# Patient Record
Sex: FEMALE | Race: WHITE
[De-identification: names, ages, dates, MRNs, and addresses within clinical notes are randomized per-mention and may not be internally consistent; named-entity substitution may affect disease eponyms.]

---

## 2018-04-06 ENCOUNTER — HOSPITAL ENCOUNTER (OUTPATIENT)
Dept: HOSPITAL 75 - CARD | Age: 73
End: 2018-04-06
Attending: INTERNAL MEDICINE
Payer: MEDICARE

## 2018-04-06 VITALS — DIASTOLIC BLOOD PRESSURE: 102 MMHG | SYSTOLIC BLOOD PRESSURE: 231 MMHG

## 2018-04-06 DIAGNOSIS — R07.9: ICD-10-CM

## 2018-04-06 DIAGNOSIS — I10: ICD-10-CM

## 2018-04-06 DIAGNOSIS — I25.10: Primary | ICD-10-CM

## 2018-04-06 PROCEDURE — 93017 CV STRESS TEST TRACING ONLY: CPT

## 2018-04-06 PROCEDURE — 78452 HT MUSCLE IMAGE SPECT MULT: CPT

## 2018-04-06 NOTE — STRESS TEST
DATE OF SERVICE:  04/06/2018



NUCLEAR MYOVIEW REPORT



REFERRING PHYSICIAN:

Dr. Araiza.



SUMMARY:

The patient was injected with 10.40 mCi of technetium-99 Myoview and the resting

images were obtained.  With peak stress level, a 32.1 mCi of technetium-99

Myoview were injected and the stress images were acquired, the resting and

stress images were reviewed and compared in the short axis, horizontal long

axis, and vertical long axis views.  Review of the images showed extracardiac

attenuation with ischemia involving the basal to mid inferior wall and basal to

mid anterior wall.  SSS is 9, SDS 5, TID value is 0.96.  On the gated images,

the left ventricle appeared to be prominent with diffuse left ventricular

hypokinesia, more pronounced at the inferior wall.  Calculated ejection fraction

of 39%.



CONCLUSION:

1.  Extracardiac attenuation with reversible ischemia involving the basal to mid

inferior wall and basal to mid anterior wall.

2.  Prominent left ventricle with mild diffuse left ventricular hypokinesia,

more pronounced at the inferior wall.  Calculated ejection fraction of 39%.





Job ID: [id number]

DocumentID: [id number]

Dictated Date:  04/06/2018 10:17:12

Transcription Date: 04/06/2018 11:43:25

Dictated By: MOHINDER PINA MD

## 2018-04-17 ENCOUNTER — HOSPITAL ENCOUNTER (OUTPATIENT)
Dept: HOSPITAL 75 - CATH | Age: 73
Discharge: HOME | End: 2018-04-17
Attending: INTERNAL MEDICINE
Payer: MEDICARE

## 2018-04-17 VITALS — DIASTOLIC BLOOD PRESSURE: 91 MMHG | SYSTOLIC BLOOD PRESSURE: 171 MMHG

## 2018-04-17 VITALS — DIASTOLIC BLOOD PRESSURE: 104 MMHG | SYSTOLIC BLOOD PRESSURE: 181 MMHG

## 2018-04-17 VITALS — WEIGHT: 140 LBS | BODY MASS INDEX: 27.48 KG/M2 | HEIGHT: 60 IN

## 2018-04-17 VITALS — DIASTOLIC BLOOD PRESSURE: 94 MMHG | SYSTOLIC BLOOD PRESSURE: 158 MMHG

## 2018-04-17 VITALS — DIASTOLIC BLOOD PRESSURE: 95 MMHG | SYSTOLIC BLOOD PRESSURE: 177 MMHG

## 2018-04-17 VITALS — SYSTOLIC BLOOD PRESSURE: 167 MMHG | DIASTOLIC BLOOD PRESSURE: 78 MMHG

## 2018-04-17 VITALS — SYSTOLIC BLOOD PRESSURE: 143 MMHG | DIASTOLIC BLOOD PRESSURE: 88 MMHG

## 2018-04-17 VITALS — SYSTOLIC BLOOD PRESSURE: 171 MMHG | DIASTOLIC BLOOD PRESSURE: 105 MMHG

## 2018-04-17 VITALS — DIASTOLIC BLOOD PRESSURE: 100 MMHG | SYSTOLIC BLOOD PRESSURE: 178 MMHG

## 2018-04-17 DIAGNOSIS — I73.9: ICD-10-CM

## 2018-04-17 DIAGNOSIS — R94.39: ICD-10-CM

## 2018-04-17 DIAGNOSIS — R06.02: ICD-10-CM

## 2018-04-17 DIAGNOSIS — I77.1: ICD-10-CM

## 2018-04-17 DIAGNOSIS — Z95.1: ICD-10-CM

## 2018-04-17 DIAGNOSIS — Z79.899: ICD-10-CM

## 2018-04-17 DIAGNOSIS — E78.5: ICD-10-CM

## 2018-04-17 DIAGNOSIS — I25.10: Primary | ICD-10-CM

## 2018-04-17 DIAGNOSIS — I10: ICD-10-CM

## 2018-04-17 DIAGNOSIS — Z79.82: ICD-10-CM

## 2018-04-17 LAB
ALBUMIN SERPL-MCNC: 4.6 GM/DL (ref 3.2–4.5)
ALP SERPL-CCNC: 97 U/L (ref 40–136)
ALT SERPL-CCNC: 11 U/L (ref 0–55)
APTT BLD: 28 SEC (ref 24–35)
BILIRUB SERPL-MCNC: 0.8 MG/DL (ref 0.1–1)
BUN/CREAT SERPL: 21
CALCIUM SERPL-MCNC: 9.7 MG/DL (ref 8.5–10.1)
CHLORIDE SERPL-SCNC: 106 MMOL/L (ref 98–107)
CHOLEST SERPL-MCNC: 190 MG/DL (ref ?–200)
CO2 SERPL-SCNC: 24 MMOL/L (ref 21–32)
CREAT SERPL-MCNC: 0.73 MG/DL (ref 0.6–1.3)
ERYTHROCYTE [DISTWIDTH] IN BLOOD BY AUTOMATED COUNT: 14.1 % (ref 10–14.5)
GFR SERPLBLD BASED ON 1.73 SQ M-ARVRAT: > 60 ML/MIN
GLUCOSE SERPL-MCNC: 103 MG/DL (ref 70–105)
HCT VFR BLD CALC: 46 % (ref 35–52)
HDLC SERPL-MCNC: 45 MG/DL (ref 40–60)
HGB BLD-MCNC: 15.6 G/DL (ref 11.5–16)
INR PPP: 1 (ref 0.8–1.4)
MCH RBC QN AUTO: 28 PG (ref 25–34)
MCHC RBC AUTO-ENTMCNC: 34 G/DL (ref 32–36)
MCV RBC AUTO: 83 FL (ref 80–99)
PLATELET # BLD: 177 10^3/UL (ref 130–400)
PMV BLD AUTO: 11.6 FL (ref 7.4–10.4)
POTASSIUM SERPL-SCNC: 4.1 MMOL/L (ref 3.6–5)
PROT SERPL-MCNC: 8.2 GM/DL (ref 6.4–8.2)
PROTHROMBIN TIME: 13.3 SEC (ref 12.2–14.7)
RBC # BLD AUTO: 5.55 10^6/UL (ref 4.35–5.85)
SODIUM SERPL-SCNC: 140 MMOL/L (ref 135–145)
TRIGL SERPL-MCNC: 193 MG/DL (ref ?–150)
VLDLC SERPL CALC-MCNC: 39 MG/DL (ref 5–40)
WBC # BLD AUTO: 9.1 10^3/UL (ref 4.3–11)

## 2018-04-17 PROCEDURE — 85730 THROMBOPLASTIN TIME PARTIAL: CPT

## 2018-04-17 PROCEDURE — 36415 COLL VENOUS BLD VENIPUNCTURE: CPT

## 2018-04-17 PROCEDURE — 85027 COMPLETE CBC AUTOMATED: CPT

## 2018-04-17 PROCEDURE — 85610 PROTHROMBIN TIME: CPT

## 2018-04-17 PROCEDURE — 87081 CULTURE SCREEN ONLY: CPT

## 2018-04-17 PROCEDURE — 93452 LEFT HRT CATH W/VENTRCLGRPHY: CPT

## 2018-04-17 PROCEDURE — 80061 LIPID PANEL: CPT

## 2018-04-17 PROCEDURE — 80053 COMPREHEN METABOLIC PANEL: CPT

## 2018-04-17 PROCEDURE — 93005 ELECTROCARDIOGRAM TRACING: CPT

## 2018-04-17 NOTE — CARDIAC PROCEDURE NOTE-CS/ASA
Pre-Procedure Note


Pre-Op Procedure Note


H&P Reviewed


The H&P was reviewed, patient examined and no changes noted.


Date H&P Reviewed:  Apr 17, 2018


Time H&P Reviewed:  11:25





Conscious Sedation Pre-Proced


Time Reviewed:  11:26


ASA Class:  3











Airway Mallampati Classification: ([place] appropriate class) I.  II.  III,  IV


 


Lungs 


 


Heart 


 


 ASA score


 


 ASA 1: a normal healthy patient


 


 ASA 2:  a patient with a mild systemic disease (mid diabetes, controlled 

hypertension, obesity 


 


 ASA 3:  a patient with a severe systemic disease that limits activity  (angina

, COPD, prior Myocardial infarction)


 


 ASA 4:  a patient with an incapacitating disease that is a constant threat to 

life (CHF, renal failure)


 


 ASA 5:  a moribund patient not expected to survive 24 hrs.  (ruptured aneurysm)


 


 ASA 6:  a declared brain dead patient whose organs are being harvested.


 


 For emergent operations, add the letter E after the classification








Grade 2


Sedation Plan:  Analgesia, Amnesia, Plan communicated to team members, 

Discussed options with patient/fam, Discussed risks with patient/fam


Note


The patient is an appropriate candidate to undergo the planned procedure, 

sedation, and anesthesia.





The patient immediately re-assessed prior to indication.











DAVID YOO MD FACP FAC CCDS Apr 17, 2018 11:26

## 2018-04-17 NOTE — DISCHARGE INST-POST CATH
Discharge Inst-CATH


Post Cardiac Cath D/C Inst


Follow Up/Plan


F/u with Dr Huff in 1-2 weeks


CARDIAC CATH DISCHARGE INSTRUCTIONS





*Hold Metformin for 48 hours post heart cath.





ACTIVITY





* Go Home directly and rest.


* Limit activity of the leg (or wrist if it was used) for 7 days including 

aerobics, swimming,


   jogging, bicycling, etc.


* Restrict stair-climbing for 7 days if possible, if not, climb up with your non

-cath leg, then


   bring together on the same step.


* Avoid lifting, pushing, pulling or excessive movement of the affected 

extremity for 7 days.


* Customary sexual activity may be resumed after 2 days-use caution not to use 

a position  


   that strains or causes pain to the affected extremity.


* No driving for 24 hours.


* NO SMOKING. 


* Avoid straining for bowel movements for 7 days.


* Gentle walking on level ground is allowed.


* Returning to work will depend on the type of procedure and the results. Your 

doctor will discuss


   this with you.





CALL YOUR DOCTOR FOR ANY OF THE FOLLOWING:





*If bleeding from the puncture site occurs- Apply gentle pressure to site with 

clean cloth and call


   your doctor or EMS.


* If a knot or lump forms under the skin, increases in size, or causes pain.


* If bruising appears to be worsening or moving further down your leg instead 

of disappearing.


* Temperature above 101 F.





CARE OF YOUR GROIN INCISION;





* Bruising or purple discoloration of the skin near the puncture site is common.


* You may shower only, no bathtub bathing for 5 days.  Be careful to avoid 

slipping as your


   leg may feel stiff.


* If a closure device was used on your femoral artery, please see the attached 

guide regarding


   care of the device and your leg.


* REMOVE the dressing from your groin the next day after your procedure in the 

shower.





CARE OF YOUR WRIST INCISION;





* Bruising or purple discoloration of the skin near the puncture site is common.


* You may shower.


* DO NOT submerge wrist.


* Remove dressing in 24 hours.











DAVID HUFF MD [facility] Apr 17, 2018 12:08

## 2018-04-17 NOTE — DISCHARGE INST-CARDIOLOGY
Discharge Inst-Cardiac


Discharge Medications


Continued Medications:  


Amlodipine Besylate (Amlodipine Besylate) 5 Mg Tablet


5 MG PO DAILY, TAB





Aspirin (Low Dose Aspirin) 81 Mg Tablet.dr


81 MG PO HS





Atorvastatin Calcium (Atorvastatin Calcium) 10 Mg Tablet


10 MG PO DAILY, TAB





Lisinopril (Lisinopril) 20 Mg Tablet


20 MG PO HS





Minoxidil (Minoxidil) 10 Mg Tab


10 MG PO BID, TAB











Orders-Post D/C & Referrals


Pneu Vac Indicated:  Yes











DAVID YOO MD FACP FACC CCDS Apr 17, 2018 12:08

## 2018-04-17 NOTE — CARDIAC CATHETERIZATION
DATE OF SERVICE:  04/17/2018



CARDIAC CATHETERIZATION REPORT



HISTORY:

The patient is a 73-year-old lady with a history of coronary artery bypass

surgery, who has had a recent stress test, which was reported as being abnormal.

 Cardiac catheterization was planned today.



PROCEDURE:

We brought her to the cardiac catheterization laboratory in a fasting state. 

Right groin was prepped and draped in usual sterile fashion.  1% Lidocaine was

used as local anesthesia.  Modified Seldinger technique was used to advance a

5-[place] sheath to the right femoral artery.  We then tried to advance a

catheter over a wire to the heart, but the abdominal aorta is very tortuous and

the wire was catching at various different spots within the abdominal aorta. 

The patient is known to have had thoracoabdominal aneurysm repair.  We felt that

it would be appropriate to hold off on the cardiac catheterization procedure

until a CT angiogram of the thoracoabdominal aorta has been performed for us to

know what the safest route to the cardiac catheterization would be. 

Accordingly, we did not make any attempt to advance the catheters (because of

tortuosity and difficulty with wire advancement).  We carried out angiography of

the right femoral artery through the sheath.  Mynx was used to achieve

hemostasis.  She tolerated the procedure well.





Job ID: [id number]

DocumentID: [id number]

Dictated Date:  04/17/2018 11:47:01

Transcription Date: 04/17/2018 12:58:46

Dictated By: DAVID YOO MD, MA, FACP, FACC,

## 2018-04-17 NOTE — XMS REPORT
Continuity of Care Document

 Created on: 2018



ADAMS WATKINS

External Reference #: [id number]

: 1945

Sex: Female



Demographics







 Address  [address] [place]  [address]

 

 Home Phone  [phone] x

 

 Preferred Language  Unknown

 

 Marital Status  Unknown

 

 Restorationism Affiliation  Unknown

 

 Race  Unknown

 

 Ethnic Group  Unknown





Author







 Author  [facility]

 

 Organization  [facility]

 

 Address  Unknown

 

 Phone  Unavailable



              



Allergies

      





 Active            Description            Code            Type            
Severity            Reaction            Onset            Reported/Identified   
         Relationship to Patient            Clinical Status        

 

 Yes            NKANo Known Allergies            NKA            Miscellaneous 
Allergy            Unknown            N/A                         2007   
                               



                  



Medications

      



There is no data.                  



Problems

      





 Date Dx Coded            Attending            Type            Code            
Diagnosis            Diagnosed By        

 

 2010                         Ot            442.3                        
          

 

 2010                         Ot            782.2                        
          

 

 2011                         Ot            729.5            PAIN IN LIMB
                     

 

 09/10/2011                         Ot            272.4            
HYPERLIPIDEMIA NEC/NOS                     

 

 09/10/2011                         Ot            276.8            
HYPOPOTASSEMIA                     

 

 09/10/2011                         Ot            401.9            HYPERTENSION 
NOS                     

 

 09/10/2011                         Ot            414.00            CORON 
ATHEROSCLER NOS TYPE VESSEL, NATIV                     

 

 09/10/2011                         Ot            441.4            ABDOM AORTIC 
ANEURYSM                     

 

 09/10/2011                         Ot            780.57            UNSPECIFIED 
SLEEP APNEA                     

 

 09/10/2011                         Ot            V45.81            
AORTOCORONARY BYPASS                     

 

 09/10/2011                         Ot            V58.63            LONG-TERM(
CURRENT)USE OF ANTIPLATELET/AN                     

 

 09/10/2011                         Ot            V58.66            LONG-TERM (
CURRENT) USE OF ASPIRIN                     

 

 09/10/2011                         Ot            V58.69            OTH MED,LT,
CURRENT USE                     

 

 2012                         Ot            272.4            
HYPERLIPIDEMIA NEC/NOS                     

 

 2012                         Ot            401.9            HYPERTENSION 
NOS                     

 

 2012                         Ot            414.01            CORONARY 
ATHEROSCLEROSIS OF NATIVE CORON                     

 

 2012                         Ot            414.2            CHRONIC 
TOTAL OCCLUSION OF CORONARY TIARA                     

 

 2012                         Ot            786.05            SHORTNESS 
OF BREATH                     

 

 2012                         Ot            786.50            CHEST PAIN 
NOS                     

 

 2012                         Ot            V45.81            
AORTOCORONARY BYPASS                     

 

 2012                         Ot            V45.82            
PERCUTANEOUS TRANSLUM CORON ANGIOPLASTY                      

 

 2012                         Ot            V45.82            
PERCUTANEOUS TRANSLUM CORON ANGIOPLASTY                      

 

 2012                         Ot            V57.89            
REHABILITATION PROC NEC                     

 

 2016                         Ot            272.4                        
          

 

 2016                         Ot            414.01                       
           

 

 2016                         Ot            V58.69                       
           

 

 2016                         Ot            272.4                        
          

 

 2016                         Ot            414.01                       
           

 

 2016                         Ot            V58.69                       
           

 

 2016                         Ot            414.01                       
           

 

 2016                         Ot            786.09                       
           

 

 2016                         Ot            V58.63                       
           

 

 2016                         Ot            V58.66                       
           

 

 2016                         Ot            V58.69                       
           

 

 2016                         Ot            276.8                        
          

 

 2016                         Ot            401.9                        
          

 

 2016                         Ot            414.01                       
           

 

 2016                         Ot            401.9                        
          

 

 2016                         Ot            V58.69                       
           

 

 2016                         Ot            272.4                        
          

 

 2016                         Ot            414.00                       
           

 

 2016                         Ot            V58.69                       
           

 

 2016                         Ot            441.4                        
          

 

 2016            ARUNA MEJIA MD            Ot            I10        
    ESSENTIAL (PRIMARY) HYPERTENSION                     

 

 2016            ARUNA MEJIA MD            Ot            I25.10     
       ATHSCL HEART DISEASE OF NATIVE CORONARY                      

 

 2016            ARUNA MEJIA MD            Ot            I25.82     
       CHRONIC TOTAL OCCLUSION OF CORONARY TIARA                     

 

 2016            ARUNA MEJIA MD            Ot            R07.9      
      CHEST PAIN, UNSPECIFIED                     

 

 2016            ARUNA MEJIA MD            Ot            Z79.899    
        OTHER LONG TERM (CURRENT) DRUG THERAPY                     

 

 2016            ARUNA MEJIA MD            Ot            Z87.891    
        PERSONAL HISTORY OF NICOTINE DEPENDENCE                     

 

 2016            ARUNA MEJIA MD            Ot            Z95.1      
      PRESENCE OF AORTOCORONARY BYPASS GRAFT                     

 

 2016            ARUNA MEJIA MD            Ot            Z98.61     
       CORONARY ANGIOPLASTY STATUS                     

 

 2016                         Ot            272.4                        
          

 

 2016                         Ot            414.01                       
           

 

 2016                         Ot            V58.69                       
           

 

 2016                         Ot            414.01                       
           

 

 2016                         Ot            786.09                       
           

 

 2016                         Ot            V58.63                       
           

 

 2016                         Ot            V58.66                       
           

 

 2016                         Ot            V58.69                       
           

 

 2016                         Ot            276.8                        
          

 

 2016                         Ot            401.9                        
          

 

 2016                         Ot            414.01                       
           

 

 2016                         Ot            401.9                        
          

 

 2016                         Ot            V58.69                       
           

 

 2016                         Ot            272.4                        
          

 

 2016                         Ot            414.00                       
           

 

 2016                         Ot            V58.69                       
           

 

 2016                         Ot            441.4                        
          

 

 2016                         Ot            272.4            
HYPERLIPIDEMIA NEC/NOS                     

 

 2016                         Ot            414.01            CORONARY 
ATHEROSCLEROSIS OF NATIVE CORON                     

 

 2016                         Ot            V58.69            OTH MED,LT,
CURRENT USE                     

 

 2016                         Ot            414.01            CORONARY 
ATHEROSCLEROSIS OF NATIVE CORON                     

 

 2016                         Ot            786.09            RESPIRATORY 
ABNORM NEC                     

 

 2016                         Ot            V58.63            LONG-TERM(
CURRENT)USE OF ANTIPLATELET/AN                     

 

 2016                         Ot            V58.66            LONG-TERM (
CURRENT) USE OF ASPIRIN                     

 

 2016                         Ot            V58.69            OTH MED,LT,
CURRENT USE                     

 

 2016                         Ot            276.8            
HYPOPOTASSEMIA                     

 

 2016                         Ot            401.9            HYPERTENSION 
NOS                     

 

 2016                         Ot            414.01            CORONARY 
ATHEROSCLEROSIS OF NATIVE CORON                     

 

 2016                         Ot            401.9            HYPERTENSION 
NOS                     

 

 2016                         Ot            V58.69            OTH MED,LT,
CURRENT USE                     

 

 2016                         Ot            272.4            
HYPERLIPIDEMIA NEC/NOS                     

 

 2016                         Ot            414.00            CORON 
ATHEROSCLER NOS TYPE VESSEL, NATIV                     

 

 2016                         Ot            V58.69            OTH MED,LT,
CURRENT USE                     

 

 2016                         Ot            441.4            ABDOM AORTIC 
ANEURYSM                     

 

 2018            TISH DE LOS SANTOS DO            Ot            I10     
       ESSENTIAL (PRIMARY) HYPERTENSION                     

 

 2018            TISH DE LOS SANTOS DO            Ot            I25.10  
          ATHSCL HEART DISEASE OF NATIVE CORONARY                      

 

 2018            TISH DE LOS SANTOS DO            Ot            R07.9   
         CHEST PAIN, UNSPECIFIED                     

 

 2018            TISH DE LOS SANTOS DO            Ot            I10     
       ESSENTIAL (PRIMARY) HYPERTENSION                     

 

 2018            TISH DE LOS SANTOS DO            Ot            I25.10  
          ATHSCL HEART DISEASE OF NATIVE CORONARY                      

 

 2018            TISH DE LOS SANTOS DO            Ot            R07.9   
         CHEST PAIN, UNSPECIFIED                     



                                                                               
                                                                               
                                        



Procedures

      



There is no data.                  



Results

      



There is no data.              



Encounters

      





 ACCT No.            Visit Date/Time            Discharge            Status    
        Pt. Type            Provider            Facility            Loc./Unit  
          Complaint        

 

 [id number]            2018 06:33:00            2018 23:59:59    
        CLS            Outpatient            TISH DE LOS SANTOS DO [facility]            CHEST PAIN, 
HYPERTENSION        

 

 [id number]            2016 21:54:00            2016 11:45:00    
        DIS            Outpatient            ROBERTO CANALES, ARUNA NICHOLS            [facility]                     

 

 [id number]            2012 07:42:00                                   
   Document Registration                                                       
     

 

 [id number]            2012 09:00:00                                   
   Document Registration                                                       
     

 

 [id number]            2012 08:08:00                                   
   Document Registration                                                       
     

 

 [id number]            2012 12:10:00                                   
   Document Registration                                                       
     

 

 [id number]            2011 07:55:00                                   
   Document Registration                                                       
     

 

 [id number]            2011 10:58:00                                   
   Document Registration                                                       
     

 

 [id number]            2011 08:52:00                                   
   Document Registration                                                       
     

 

 [id number]            2011 19:40:00                                   
   Document Registration                                                       
     

 

 [id number]            2011 07:41:00                                   
   Document Registration                                                       
     

 

 [id number]            2011 07:51:00                                   
   Document Registration                                                       
     

 

 [id number]            2010 08:17:00                                   
   Document Registration                                                       
     

 

 [id number]            2010 10:28:00                                   
   Document Registration

## 2021-03-03 ENCOUNTER — HOSPITAL ENCOUNTER (OUTPATIENT)
Dept: HOSPITAL 75 - CARD | Age: 76
LOS: 90 days | Discharge: HOME | End: 2021-06-01
Attending: INTERNAL MEDICINE
Payer: MEDICARE

## 2021-03-03 DIAGNOSIS — R00.2: Primary | ICD-10-CM

## 2021-03-03 PROCEDURE — 93226 XTRNL ECG REC<48 HR SCAN A/R: CPT

## 2021-03-03 PROCEDURE — 93225 XTRNL ECG REC<48 HRS REC: CPT

## 2021-03-14 ENCOUNTER — HOSPITAL ENCOUNTER (INPATIENT)
Dept: HOSPITAL 75 - ER | Age: 76
LOS: 3 days | Discharge: HOME | DRG: 282 | End: 2021-03-17
Attending: FAMILY MEDICINE | Admitting: FAMILY MEDICINE
Payer: MEDICARE

## 2021-03-14 VITALS — BODY MASS INDEX: 26.62 KG/M2 | HEIGHT: 59.02 IN | WEIGHT: 132.06 LBS

## 2021-03-14 DIAGNOSIS — E78.00: ICD-10-CM

## 2021-03-14 DIAGNOSIS — I48.0: Primary | ICD-10-CM

## 2021-03-14 DIAGNOSIS — I25.10: ICD-10-CM

## 2021-03-14 DIAGNOSIS — I73.9: ICD-10-CM

## 2021-03-14 DIAGNOSIS — I25.2: ICD-10-CM

## 2021-03-14 DIAGNOSIS — Z95.1: ICD-10-CM

## 2021-03-14 DIAGNOSIS — I65.29: ICD-10-CM

## 2021-03-14 DIAGNOSIS — F41.9: ICD-10-CM

## 2021-03-14 DIAGNOSIS — Z79.82: ICD-10-CM

## 2021-03-14 DIAGNOSIS — I21.A1: ICD-10-CM

## 2021-03-14 DIAGNOSIS — Z95.5: ICD-10-CM

## 2021-03-14 DIAGNOSIS — I10: ICD-10-CM

## 2021-03-14 LAB
ALBUMIN SERPL-MCNC: 3.9 GM/DL (ref 3.2–4.5)
ALP SERPL-CCNC: 106 U/L (ref 40–136)
ALT SERPL-CCNC: 32 U/L (ref 0–55)
APTT BLD: 29 SEC (ref 24–35)
BASOPHILS # BLD AUTO: 0.1 10^3/UL (ref 0–0.1)
BASOPHILS NFR BLD AUTO: 1 % (ref 0–10)
BILIRUB SERPL-MCNC: 0.7 MG/DL (ref 0.1–1)
BUN/CREAT SERPL: 15
CALCIUM SERPL-MCNC: 8.8 MG/DL (ref 8.5–10.1)
CHLORIDE SERPL-SCNC: 104 MMOL/L (ref 98–107)
CO2 SERPL-SCNC: 22 MMOL/L (ref 21–32)
CREAT SERPL-MCNC: 0.84 MG/DL (ref 0.6–1.3)
EOSINOPHIL # BLD AUTO: 0.2 10^3/UL (ref 0–0.3)
EOSINOPHIL NFR BLD AUTO: 2 % (ref 0–10)
GFR SERPLBLD BASED ON 1.73 SQ M-ARVRAT: > 60 ML/MIN
GLUCOSE SERPL-MCNC: 144 MG/DL (ref 70–105)
HCT VFR BLD CALC: 43 % (ref 35–52)
HGB BLD-MCNC: 13.8 G/DL (ref 11.5–16)
INR PPP: 1 (ref 0.8–1.4)
LYMPHOCYTES # BLD AUTO: 1.7 10^3/UL (ref 1–4)
LYMPHOCYTES NFR BLD AUTO: 17 % (ref 12–44)
MAGNESIUM SERPL-MCNC: 2 MG/DL (ref 1.6–2.4)
MANUAL DIFFERENTIAL PERFORMED BLD QL: NO
MCH RBC QN AUTO: 28 PG (ref 25–34)
MCHC RBC AUTO-ENTMCNC: 32 G/DL (ref 32–36)
MCV RBC AUTO: 86 FL (ref 80–99)
MONOCYTES # BLD AUTO: 0.8 10^3/UL (ref 0–1)
MONOCYTES NFR BLD AUTO: 8 % (ref 0–12)
NEUTROPHILS # BLD AUTO: 7 10^3/UL (ref 1.8–7.8)
NEUTROPHILS NFR BLD AUTO: 71 % (ref 42–75)
PLATELET # BLD: 201 10^3/UL (ref 130–400)
PMV BLD AUTO: 12 FL (ref 9–12.2)
POTASSIUM SERPL-SCNC: 3.5 MMOL/L (ref 3.6–5)
PROT SERPL-MCNC: 7 GM/DL (ref 6.4–8.2)
PROTHROMBIN TIME: 13.3 SEC (ref 12.2–14.7)
SODIUM SERPL-SCNC: 140 MMOL/L (ref 135–145)
TSH SERPL DL<=0.05 MIU/L-ACNC: 1.61 UIU/ML (ref 0.35–4.94)
WBC # BLD AUTO: 9.8 10^3/UL (ref 4.3–11)

## 2021-03-14 PROCEDURE — 85610 PROTHROMBIN TIME: CPT

## 2021-03-14 PROCEDURE — 71045 X-RAY EXAM CHEST 1 VIEW: CPT

## 2021-03-14 PROCEDURE — 93041 RHYTHM ECG TRACING: CPT

## 2021-03-14 PROCEDURE — 80053 COMPREHEN METABOLIC PANEL: CPT

## 2021-03-14 PROCEDURE — 83735 ASSAY OF MAGNESIUM: CPT

## 2021-03-14 PROCEDURE — 85730 THROMBOPLASTIN TIME PARTIAL: CPT

## 2021-03-14 PROCEDURE — 94760 N-INVAS EAR/PLS OXIMETRY 1: CPT

## 2021-03-14 PROCEDURE — 84443 ASSAY THYROID STIM HORMONE: CPT

## 2021-03-14 PROCEDURE — 83874 ASSAY OF MYOGLOBIN: CPT

## 2021-03-14 PROCEDURE — 84484 ASSAY OF TROPONIN QUANT: CPT

## 2021-03-14 PROCEDURE — 87081 CULTURE SCREEN ONLY: CPT

## 2021-03-14 PROCEDURE — 93306 TTE W/DOPPLER COMPLETE: CPT

## 2021-03-14 PROCEDURE — 85025 COMPLETE CBC W/AUTO DIFF WBC: CPT

## 2021-03-14 PROCEDURE — 96372 THER/PROPH/DIAG INJ SC/IM: CPT

## 2021-03-14 PROCEDURE — 84100 ASSAY OF PHOSPHORUS: CPT

## 2021-03-14 PROCEDURE — 80061 LIPID PANEL: CPT

## 2021-03-14 PROCEDURE — 36415 COLL VENOUS BLD VENIPUNCTURE: CPT

## 2021-03-14 PROCEDURE — 96374 THER/PROPH/DIAG INJ IV PUSH: CPT

## 2021-03-14 PROCEDURE — 80048 BASIC METABOLIC PNL TOTAL CA: CPT

## 2021-03-14 PROCEDURE — 93005 ELECTROCARDIOGRAM TRACING: CPT

## 2021-03-14 NOTE — ED CARDIAC GENERAL
History of Present Illness


General


Chief Complaint:  Cardiac/General Problems


Stated Complaint:  AFIB;RVR


Nursing Triage Note:  


Pt arrived via EMS after being awaken by her heart racing. EMS gave 10 mg of 


Cardizem in field with improvement. Pt denies chest pain upon arrival at ER. 


Current HR is 130


Source:  patient


Exam Limitations:  no limitations





History of Present Illness


Date Seen by Provider:  Mar 14, 2021


Time Seen by Provider:  22:05


Initial Comments


This75-year-old woman presents to the emergency room with complaints of racing 

heart and chest pressure that started around 21:00.  She reports a history of 1 

prior episode of atrial fibrillation.  She is on metoprolol but she is not 

anticoagulated.  Dr. Huff is her cardiologist.  She reports poor appetite over

the past week and feeling fatigued earlier today.  She also has history of 

coronary artery disease status post CABG. EMS administered Cardizem 10 mg IV 

with good response to heart rate.





Allergies and Home Medications


Allergies


Coded Allergies:  


     NKANo Known Allergies (Verified  Allergy, Unknown, 4/24/07)





Home Medications


Amlodipine Besylate 5 Mg Tablet, 5 MG PO DAILY, (Reported)


Aspirin 81 Mg Tablet.dr, 81 MG PO HS, (Reported)


Atorvastatin Calcium 10 Mg Tablet, 10 MG PO DAILY, (Reported)


Lisinopril 20 Mg Tablet, 20 MG PO HS, (Reported)


Minoxidil 10 Mg Tab, 10 MG PO BID, (Reported)





Patient Home Medication List


Home Medication List Reviewed:  Yes





Review of Systems


Review of Systems


Constitutional:  see HPI


EENTM:  No Symptoms Reported


Respiratory:  No Symptoms Reported


Cardiovascular:  See HPI


Gastrointestinal:  No Symptoms Reported


Genitourinary:  No Symptoms Reported


Musculoskeletal:  no symptoms reported


Skin:  no symptoms reported


Psychiatric/Neurological:  No Symptoms Reported


Endocrine:  No Symptoms Reported


Hematologic/Lymphatic:  No Symptoms Reported





Past Medical-Social-Family Hx


Past Med/Social Hx:  Reviewed Nursing Past Med/Soc Hx


Patient Social History


Alcohol Use:  Denies Use


2nd Hand Smoke Exposure:  No


Recent Infectious Disease Expo:  No


Recent Hopitalizations:  Yes





Immunizations Up To Date


Tetanus Booster (TDap):  More than 5yrs


PED Vaccines UTD:  No


Date of Pneumonia Vaccine:  Jan 25, 2007





Seasonal Allergies


Seasonal Allergies:  No





Past Medical History


Surgeries:  Yes (AAA REPAIR; LEFT LEG ANURYSM REPAIR; CARDIAC STENTS X 5;CABG)


Abdominal, Cardiac, CABG, Coronary Stent, Tonsillectomy, Vascular Surgery


Respiratory:  Yes


Sleep Apnea


Currently Using CPAP:  No


Currently Using BIPAP:  No


Cardiac:  Yes


Coronary Artery Disease, Heart Attack, High Cholesterol, Hypertension, 

Peripheral Vascular


Neurological:  No


Reproductive Disorders:  No


GYN History:  Menopausal


Gastrointestinal:  No


Musculoskeletal:  No


Endocrine:  No


Cancer:  No


Psychosocial:  Yes


Anxiety


Integumentary:  No


Blood Disorders:  No


Adverse Reaction/Blood Tranf:  No





Family Medical History





FHx: stroke


  19 FATHER


  19 MOTHER





Physical Exam


Vital Signs





Vital Signs - First Documented








 3/14/21 3/14/21





 22:06 22:18


 


Temp 36.4 


 


Pulse 134 


 


Resp 20 


 


B/P (MAP) 150/118 (129) 


 


Pulse Ox 95 


 


O2 Delivery  Room Air





Capillary Refill : Less Than 3 Seconds


Height, Weight, BMI


Height: 5'0.00"


Weight: 140lbs. 0.0oz. 63.932121gm; 28.00 BMI


Method:Stated


General Appearance:  No Apparent Distress, WD/WN


HEENT:  PERRL/EOMI, Normal ENT Inspection


Neck:  Normal Inspection; No JVD


Respiratory:  Lungs Clear, Normal Breath Sounds, No Accessory Muscle Use


Cardiovascular:  No Edema, No Murmur, Irregularly Irregular


Gastrointestinal:  Normal Bowel Sounds, Non Tender, Soft


Extremity:  Normal Inspection, No Pedal Edema


Neurologic/Psychiatric:  Alert, Oriented x3, No Motor/Sensory Deficits, Normal 

Mood/Affect, CNs II-XII Norm as Tested


Skin:  Normal Color, Warm/Dry





Progress/Results/Core Measures


Results/Orders


Lab Results





Laboratory Tests








Test


 3/14/21


21:08 Range/Units


 


 


White Blood Count


 9.8 


 4.3-11.0


10^3/uL


 


Red Blood Count


 4.94 


 3.80-5.11


10^6/uL


 


Hemoglobin 13.8  11.5-16.0  g/dL


 


Hematocrit 43  35-52  %


 


Mean Corpuscular Volume 86  80-99  fL


 


Mean Corpuscular Hemoglobin 28  25-34  pg


 


Mean Corpuscular Hemoglobin


Concent 32 


 32-36  g/dL





 


Red Cell Distribution Width 13.1  10.0-14.5  %


 


Platelet Count


 201 


 130-400


10^3/uL


 


Mean Platelet Volume 12.0  9.0-12.2  fL


 


Immature Granulocyte % (Auto) 0   %


 


Neutrophils (%) (Auto) 71  42-75  %


 


Lymphocytes (%) (Auto) 17  12-44  %


 


Monocytes (%) (Auto) 8  0-12  %


 


Eosinophils (%) (Auto) 2  0-10  %


 


Basophils (%) (Auto) 1  0-10  %


 


Neutrophils # (Auto)


 7.0 


 1.8-7.8


10^3/uL


 


Lymphocytes # (Auto)


 1.7 


 1.0-4.0


10^3/uL


 


Monocytes # (Auto)


 0.8 


 0.0-1.0


10^3/uL


 


Eosinophils # (Auto)


 0.2 


 0.0-0.3


10^3/uL


 


Basophils # (Auto)


 0.1 


 0.0-0.1


10^3/uL


 


Immature Granulocyte # (Auto)


 0.0 


 0.0-0.1


10^3/uL


 


Prothrombin Time 13.3  12.2-14.7  SEC


 


INR Comment 1.0  0.8-1.4  


 


Activated Partial


Thromboplast Time 29 


 24-35  SEC





 


Sodium Level 140  135-145  MMOL/L


 


Potassium Level 3.5 L 3.6-5.0  MMOL/L


 


Chloride Level 104    MMOL/L


 


Carbon Dioxide Level 22  21-32  MMOL/L


 


Anion Gap 14  5-14  MMOL/L


 


Blood Urea Nitrogen 13  7-18  MG/DL


 


Creatinine


 0.84 


 0.60-1.30


MG/DL


 


Estimat Glomerular Filtration


Rate > 60 


  





 


BUN/Creatinine Ratio 15   


 


Glucose Level 144 H   MG/DL


 


Calcium Level 8.8  8.5-10.1  MG/DL


 


Corrected Calcium 8.9  8.5-10.1  MG/DL


 


Magnesium Level 2.0  1.6-2.4  MG/DL


 


Total Bilirubin 0.7  0.1-1.0  MG/DL


 


Aspartate Amino Transf


(AST/SGOT) 47 H


 5-34  U/L





 


Alanine Aminotransferase


(ALT/SGPT) 32 


 0-55  U/L





 


Alkaline Phosphatase 106    U/L


 


Myoglobin


 33.5 


 10.0-92.0


NG/ML


 


Troponin I < 0.028  <0.028  NG/ML


 


Total Protein 7.0  6.4-8.2  GM/DL


 


Albumin 3.9  3.2-4.5  GM/DL


 


TSH Cascade Testing


 1.61 


 0.35-4.94


UIU/ML








My Orders





Orders - TAMMY GALLAGHER MD


Cbc With Automated Diff (3/14/21 22:06)


Magnesium (3/14/21 22:06)


Chest 1 View, Ap/Pa Only (3/14/21 22:06)


Ekg Tracing (3/14/21 22:06)


Comprehensive Metabolic Panel (3/14/21 22:06)


Myoglobin Serum (3/14/21 22:06)


Protime With Inr (3/14/21 22:06)


Partial Thromboplastin Time (3/14/21 22:06)


O2 (3/14/21 22:06)


Monitor-Rhythm Ecg Trace Only (3/14/21 22:06)


Ed Iv/Invasive Line Start (3/14/21 22:06)


Troponin I (3/14/21 22:06)


Thyroid Analyzer (3/14/21 22:06)


Diltiazem Drip Pre-Mix (Cardizem Drip Pr (3/14/21 22:15)


Enoxaparin Injection (Lovenox Injection) (3/14/21 23:30)





Medications Given in ED





Current Medications








 Medications  Dose


 Ordered  Sig/Lloyd


 Route  Start Time


 Stop Time Status Last Admin


Dose Admin


 


 Enoxaparin Sodium  60 mg  ONCE  ONCE


 SC  3/14/21 23:30


 3/14/21 23:31 DC 3/14/21 23:33


60 MG








Vital Signs/I&O











 3/14/21 3/14/21





 22:06 22:18


 


Temp 36.4 


 


Pulse 134 


 


Resp 20 


 


B/P (MAP) 150/118 (129) 


 


Pulse Ox 95 96


 


O2 Delivery  Room Air














Blood Pressure Mean:                    129











Progress


Progress Note :  


   Time:  07:32


Progress Note


Patient was started on a Cardizem drip with mild improvement in her heart rate. 

She was feeling better on Cardizem.  Lovenox was given for initial stroke 

prophylaxis.


Initial ECG Impression Date:  Mar 14, 2021


Initial ECG Impression Time:  22:08


Initial ECG Rate:  141


Initial ECG Rhythm:  A Fib/Flutter


Initial ECG Impression:  Atrial Fibrillation w/RVR


Comment


Atrial fibrillation with RVR.  No ischemic ST elevation or depression.





Diagnostic Imaging





   Diagonstic Imaging:  Xray


   Plain Films/CT/US/NM/MRI:  chest


Comments


Chest x-ray viewed by me and compared with prior.  Report not yet available.  No

acute changes appreciated.





Departure


Communication (Admissions)


Time/Spoke to Admitting Phy:  23:15


Dr. Engle


Time/Spoke to Consulting Phy:  23:10


Dr. Cabrera





Impression





   Primary Impression:  


   Atrial fibrillation with RVR


Disposition:  09 ADMITTED AS INPATIENT


Condition:  Improved





Admissions


Decision to Admit Reason:  Admit from ER (General)


Decision to Admit/Date:  Mar 14, 2021


Time/Decision to Admit Time:  22:05





Departure-Patient Inst.


Referrals:  


TISH DE LOS SANTOS DO (PCP/Family)


Primary Care Physician





Copy


Copies To 1:   TISH DE LOS SANTOS JOSHUA T MD        Mar 14, 2021 23:25

## 2021-03-15 LAB
BASOPHILS # BLD AUTO: 0.1 10^3/UL (ref 0–0.1)
BASOPHILS NFR BLD AUTO: 1 % (ref 0–10)
BUN/CREAT SERPL: 13
CALCIUM SERPL-MCNC: 8.5 MG/DL (ref 8.5–10.1)
CHLORIDE SERPL-SCNC: 107 MMOL/L (ref 98–107)
CHOLEST SERPL-MCNC: 106 MG/DL (ref ?–200)
CO2 SERPL-SCNC: 20 MMOL/L (ref 21–32)
CREAT SERPL-MCNC: 0.75 MG/DL (ref 0.6–1.3)
EOSINOPHIL # BLD AUTO: 0.1 10^3/UL (ref 0–0.3)
EOSINOPHIL NFR BLD AUTO: 1 % (ref 0–10)
GFR SERPLBLD BASED ON 1.73 SQ M-ARVRAT: > 60 ML/MIN
GLUCOSE SERPL-MCNC: 121 MG/DL (ref 70–105)
HCT VFR BLD CALC: 41 % (ref 35–52)
HDLC SERPL-MCNC: 44 MG/DL (ref 40–60)
HGB BLD-MCNC: 13.5 G/DL (ref 11.5–16)
LYMPHOCYTES # BLD AUTO: 1.5 10^3/UL (ref 1–4)
LYMPHOCYTES NFR BLD AUTO: 16 % (ref 12–44)
MAGNESIUM SERPL-MCNC: 2 MG/DL (ref 1.6–2.4)
MANUAL DIFFERENTIAL PERFORMED BLD QL: NO
MCH RBC QN AUTO: 28 PG (ref 25–34)
MCHC RBC AUTO-ENTMCNC: 33 G/DL (ref 32–36)
MCV RBC AUTO: 85 FL (ref 80–99)
MONOCYTES # BLD AUTO: 0.8 10^3/UL (ref 0–1)
MONOCYTES NFR BLD AUTO: 8 % (ref 0–12)
NEUTROPHILS # BLD AUTO: 7 10^3/UL (ref 1.8–7.8)
NEUTROPHILS NFR BLD AUTO: 74 % (ref 42–75)
PHOSPHATE SERPL-MCNC: 3.6 MG/DL (ref 2.3–4.7)
PLATELET # BLD: 197 10^3/UL (ref 130–400)
PMV BLD AUTO: 12.1 FL (ref 9–12.2)
POTASSIUM SERPL-SCNC: 4 MMOL/L (ref 3.6–5)
SODIUM SERPL-SCNC: 140 MMOL/L (ref 135–145)
TRIGL SERPL-MCNC: 87 MG/DL (ref ?–150)
VLDLC SERPL CALC-MCNC: 17 MG/DL (ref 5–40)
WBC # BLD AUTO: 9.5 10^3/UL (ref 4.3–11)

## 2021-03-15 RX ADMIN — DEXTROSE SCH MLS/HR: 5 SOLUTION INTRAVENOUS at 23:10

## 2021-03-15 RX ADMIN — METOPROLOL TARTRATE SCH MG: 25 TABLET, FILM COATED ORAL at 10:31

## 2021-03-15 RX ADMIN — DEXTROSE SCH MLS/HR: 5 SOLUTION INTRAVENOUS at 15:09

## 2021-03-15 RX ADMIN — APIXABAN SCH MG: 5 TABLET, FILM COATED ORAL at 20:12

## 2021-03-15 RX ADMIN — ASPIRIN SCH MG: 81 TABLET ORAL at 07:48

## 2021-03-15 RX ADMIN — APIXABAN SCH MG: 5 TABLET, FILM COATED ORAL at 10:31

## 2021-03-15 RX ADMIN — LISINOPRIL SCH MG: 20 TABLET ORAL at 07:48

## 2021-03-15 RX ADMIN — METOPROLOL TARTRATE SCH MG: 25 TABLET, FILM COATED ORAL at 20:12

## 2021-03-15 RX ADMIN — Medication SCH MLS/HR: at 14:33

## 2021-03-15 NOTE — HISTORY & PHYSICAL-HOSPITALIST
History of Present Illness


HPI/Chief Complaint


Jodie Reaves is a 75 year old female with PMH HTN, HLD, CAD s/p CABG, who 

presented with chest pain. She reports that she felt like her heart was going to

beat out of her chest. She denies any radiation, including to her neck, jaw, and

arm. She denies any associated dyspnea, nausea, vomiting, or diaphoresis. She 

reports waking up in the past week and feeling like she was running because her 

heart was beating so fast. She has not had any fevers or chills. She denies 

cough. She denies abdominal pain and diarrhea. She denies dysuria.


Source:  patient


Exam Limitations:  no limitations


Date Seen


3/15/21


Time Seen by a Provider:  09:55


Attending Physician


Fatimah Engle MD


PCP


Adams Araiza DO


Referring Physician





Date of Admission


Mar 14, 2021 at 23:35





Home Medications & Allergies


Home Medications


Reviewed patient Home Medication Reconciliation performed by pharmacy medication

reconciliations technician and/or nursing.


Patients Allergies have been reviewed.





Allergies





Allergies


Coded Allergies


  NKANo Known Allergies (Verified Allergy, Unknown, 4/24/07)








Past Medical-Social-Family Hx


Past Med/Social Hx:  Reviewed Nursing Past Med/Soc Hx


Patient Social History


Alcohol Use:  Denies Use


Recreational Drug Use:  No


2nd Hand Smoke Exposure:  No


Recent Foreign Travel:  No


Contact w/other who traveled:  No


Recent Hopitalizations:  Yes


Recent Infectious Disease Expo:  No





Immunizations Up To Date


Tetanus Booster (TDap):  More than 5yrs


Pediatric:  No


Date of Pneumonia Vaccine:  Jan 25, 2007





Seasonal Allergies


Seasonal Allergies:  No





Past Medical History


Surgeries:  Abdominal, Cardiac, CABG, Coronary Stent, Tonsillectomy, Vascular 

Surgery


Currently Using CPAP:  No


Currently Using BIPAP:  No


Cardiac:  Coronary Artery Disease, Heart Attack, High Cholesterol, Hypertension,

Peripheral Vascular


Reproductive:  No


Menopausal


Psychosocial:  Anxiety


History of Blood Disorders:  No


Adverse Reaction to Blood Macdonald:  No





Family History





FHx: stroke


  19 FATHER


  19 MOTHER





Review of Systems


Constitutional:  no symptoms reported


EENTM:  no symptoms reported


Respiratory:  no symptoms reported


Cardiovascular:  chest pain, palpitations


Gastrointestinal:  no symptoms reported


Genitourinary:  no symptoms reported


Musculoskeletal:  no symptoms reported


Skin:  no symptoms reported


Psychiatric/Neurological:  No Symptoms Reported





Physical Exam


Physical Exam


Vital Signs





Vital Signs - First Documented








 3/14/21 3/14/21





 22:06 22:18


 


Temp 36.4 


 


Pulse 134 


 


Resp 20 


 


B/P (MAP) 150/118 (129) 


 


Pulse Ox 95 


 


O2 Delivery  Room Air





Capillary Refill : Less Than 3 Seconds


Height, Weight, BMI


Height: 5'0.00"


Weight: 140lbs. 0.0oz. 63.534471jm; 28.79 BMI


Method:Stated


General Appearance:  No Apparent Distress, WD/WN


HEENT:  PERRL/EOMI, Pharynx Normal


Neck:  Normal Inspection, Supple


Respiratory:  Lungs Clear, Normal Breath Sounds, No Respiratory Distress


Cardiovascular:  Irregularly Irregular, Tachycardia


Gastrointestinal:  Normal Bowel Sounds, Non Tender, Soft


Extremity:  Normal Inspection, Non Tender, No Pedal Edema


Neurologic/Psychiatric:  Alert, Oriented x3, No Motor/Sensory Deficits, Normal 

Mood/Affect


Skin:  Normal Color, Warm/Dry





Results


Results/Procedures


Labs


Laboratory Tests


3/14/21 21:08








3/15/21 03:58








Patient resulted labs reviewed.


Imaging:  Reviewed Imaging Report





Assessment/Plan


Admission Diagnosis


Atrial fibrillation with rapid ventricular response


Admission Status:  Inpatient Order (span 2 midnights)


Reason for Inpatient Admission:  


AFib with RVR requiring IV medications





Assessment and Plan


Atrial fibrillation with rapid ventricular response


   Cardiology consulted, appreciate assistance


   Started on IV Cardizem


   Transitioning to oral Cardizem


   Started on Lovenox


   Transitioning to Eliquis





Elevated troponin


   Mildly elevated, likely due to tachycardia





HTN


HLD


CAD


   Continue home meds





DVT prophylaxis: already receiving therapeutic anticoagulation





Diagnosis/Problems


Diagnosis/Problems





(1) Atrial fibrillation with RVR


Status:  Acute











OMER BOJORQUEZ MD              Mar 15, 2021 13:50

## 2021-03-15 NOTE — DIAGNOSTIC IMAGING REPORT
INDICATION: Tachycardia, coronary artery disease, chest pain



COMPARISON: 02/01/2016



FINDINGS: Single view of the chest demonstrates cardiac

enlargement with mild central vascular congestion. There is no

pneumothorax or effusion. Osseous structures are stable. Sternal

wires are midline.



IMPRESSION: Cardiac enlargement with mild central vascular

congestion.



Dictated by: 



  Dictated on workstation # [id number]

## 2021-03-15 NOTE — CONSULTATION-CARDIOLOGY
HPI-Cardiology


Cardiology Consultation


Date of Consultation


3/15/21


Date of Admission





Time Seen by Provider:  09:45


Indication:  atrial fibrillation





HPI


75-year-old lady with extensive cardiac history, history of CABG, thoracic 

aortic aneurysm repair, reported episode of atrial fibrillation and feeling 

palpitation and rapid heartrate, given to the emergency room and noted to be in 

atrial fibrillation with rapid ventricular response, she was started on Cardizem

drip, heart rate is better at this time.  Denied any chest pain.  No syncope.  

Heart rate is better controlled at this time





Home Medications & Allergies


Allergies:  


Coded Allergies:  


     NKANo Known Allergies (Verified  Allergy, Unknown, 4/24/07)


Home Medication List Reviewed:  Yes





PMH-Social-Family Hx


Patient Social History


Recreational Drug Use:  No


2nd Hand Smoke Exposure:  No


Recent Hopitalizations:  Yes


Have you traveled recently?:  No


Alcohol Use?:  No





Immunizations Up To Date


Tetanus Booster (TDap):  More than 5yrs


Date of Pneumonia Vaccine:  Jan 25, 2007





Past Medical History


Discussed below





Family Medical History


Family History:  


FHx: stroke


  19 FATHER


  19 MOTHER





Review of Systems-General


Review of Systems


Constitutional:  see HPI, malaise


EENTM:  see HPI, no symptoms reported


Respiratory:  see HPI; No cough; dyspnea on exertion; No hemoptysis, No 

orthopnea, No phlegm, No short of breath, No stridor, No wheezing, No other


Cardiovascular:  see HPI; No chest pain, No edema, No Hx of Intervention; 

palpitations; No syncope, No vascular heart diseas, No other


Gastrointestinal:  no symptoms reported, see HPI


Genitourinary:  no symptoms reported, see HPI


Musculoskeletal:  no symptoms reported, see HPI


Skin:  no symptoms reported, see HPI


Psychiatric/Neurological:  No Symptoms Reported, See HPI





Reviewed Test Results


Reviewed Test Results


Lab





Laboratory Tests








Test


 3/14/21


21:08 3/15/21


03:58 Range/Units


 


 


White Blood Count


 9.8 


 9.5 


 4.3-11.0


10^3/uL


 


Red Blood Count


 4.94 


 4.85 


 3.80-5.11


10^6/uL


 


Hemoglobin 13.8  13.5  11.5-16.0  g/dL


 


Hematocrit 43  41  35-52  %


 


Mean Corpuscular Volume 86  85  80-99  fL


 


Mean Corpuscular Hemoglobin 28  28  25-34  pg


 


Mean Corpuscular Hemoglobin


Concent 32 


 33 


 32-36  g/dL





 


Red Cell Distribution Width 13.1  13.2  10.0-14.5  %


 


Platelet Count


 201 


 197 


 130-400


10^3/uL


 


Mean Platelet Volume 12.0  12.1  9.0-12.2  fL


 


Immature Granulocyte % (Auto) 0  0   %


 


Neutrophils (%) (Auto) 71  74  42-75  %


 


Lymphocytes (%) (Auto) 17  16  12-44  %


 


Monocytes (%) (Auto) 8  8  0-12  %


 


Eosinophils (%) (Auto) 2  1  0-10  %


 


Basophils (%) (Auto) 1  1  0-10  %


 


Neutrophils # (Auto)


 7.0 


 7.0 


 1.8-7.8


10^3/uL


 


Lymphocytes # (Auto)


 1.7 


 1.5 


 1.0-4.0


10^3/uL


 


Monocytes # (Auto)


 0.8 


 0.8 


 0.0-1.0


10^3/uL


 


Eosinophils # (Auto)


 0.2 


 0.1 


 0.0-0.3


10^3/uL


 


Basophils # (Auto)


 0.1 


 0.1 


 0.0-0.1


10^3/uL


 


Immature Granulocyte # (Auto)


 0.0 


 0.0 


 0.0-0.1


10^3/uL


 


Prothrombin Time 13.3   12.2-14.7  SEC


 


INR Comment 1.0   0.8-1.4  


 


Activated Partial


Thromboplast Time 29 


 


 24-35  SEC





 


Sodium Level 140  140  135-145  MMOL/L


 


Potassium Level 3.5 L 4.0  3.6-5.0  MMOL/L


 


Chloride Level 104  107    MMOL/L


 


Carbon Dioxide Level 22  20 L 21-32  MMOL/L


 


Anion Gap 14  13  5-14  MMOL/L


 


Blood Urea Nitrogen 13  10  7-18  MG/DL


 


Creatinine


 0.84 


 0.75 


 0.60-1.30


MG/DL


 


Estimat Glomerular Filtration


Rate > 60 


 > 60 


  





 


BUN/Creatinine Ratio 15  13   


 


Glucose Level 144 H 121 H   MG/DL


 


Calcium Level 8.8  8.5  8.5-10.1  MG/DL


 


Corrected Calcium 8.9   8.5-10.1  MG/DL


 


Magnesium Level 2.0  2.0  1.6-2.4  MG/DL


 


Total Bilirubin 0.7   0.1-1.0  MG/DL


 


Aspartate Amino Transf


(AST/SGOT) 47 H


 


 5-34  U/L





 


Alanine Aminotransferase


(ALT/SGPT) 32 


 


 0-55  U/L





 


Alkaline Phosphatase 106     U/L


 


Myoglobin


 33.5 


 


 10.0-92.0


NG/ML


 


Troponin I < 0.028  0.050 H <0.028  NG/ML


 


Total Protein 7.0   6.4-8.2  GM/DL


 


Albumin 3.9   3.2-4.5  GM/DL


 


TSH Cascade Testing


 1.61 


 


 0.35-4.94


UIU/ML


 


Phosphorus Level  3.6  2.3-4.7  MG/DL


 


Triglycerides Level  87  <150  MG/DL


 


Cholesterol Level  106  < 200  MG/DL


 


LDL Cholesterol Direct  33  1-129  MG/DL


 


VLDL Cholesterol  17  5-40  MG/DL


 


HDL Cholesterol  44  40-60  MG/DL











Physical Exam


Physical Exam


Vital Signs





Vital Signs - First Documented








 3/14/21 3/14/21





 22:06 22:18


 


Temp 36.4 


 


Pulse 134 


 


Resp 20 


 


B/P (MAP) 150/118 (129) 


 


Pulse Ox 95 


 


O2 Delivery  Room Air





Capillary Refill : Less Than 3 Seconds


Height, Weight, BMI


Height: 5'0.00"


Weight: 140lbs. 0.0oz. 63.379920id; 28.79 BMI


Method:Stated


General Appearance:  No Apparent Distress, WD/WN


HEENT:  PERRL/EOMI, Normal ENT Inspection


Neck:  Normal Inspection; No JVD


Respiratory:  Lungs Clear, Normal Breath Sounds, No Accessory Muscle Use


Cardiovascular:  No Edema, No JVD, No Murmur, Irregularly Irregular


Gastrointestinal:  Normal Bowel Sounds, Non Tender, Soft


Extremity:  Normal Inspection, No Pedal Edema


Neurologic/Psychiatric:  Alert, Oriented x3, No Motor/Sensory Deficits, Normal 

Mood/Affect, CNs II-XII Norm as Tested


Skin:  Normal Color, Warm/Dry





A/P-Cardiology


Admission Diagnosis


Paroxysmal atrial fibrillation


Coronary artery disease


Hypertension


Hyperlipidemia





Assessment/Plan


Paroxysmal atrial fibrillation, started with rapid ventricular response, heart 

rate is better controlled at this time, I'll switch her to oral anticoagulation 

and oral Cardizem.





[id number] score of 5, yearly risk of stroke without oral anticoagulation is 

6.7 percent.  Starting on Eliquis





Mild elevation in troponin, probably secondary to tachycardia, she has 

underlying extensive coronary artery disease not amendable to intervention





Coronary artery disease history of CABG done in 2001, had a cardiac 

catheterization in February 2016 showed the LAD has 6070 percent stenosis, 

occluded at the midportion, the distal LAD is protected by LIMA that is patent, 

high diagonal branch stent stent known to be Promus 2.2 x 12 mm placed in 2012, 

the circumflex has a patent stent in the proximal portion, RCA is occluded 

proximally protected by patent vein graft to the distal right coronary artery.  

Patient had an abnormal stress test in April 2018, attempt for cardiac 

catheterization has failed due to the extensive disease in her thoracic and 

abdominal aorta.  Medical therapy is recommended





History of thoracic and abdominal aneurysm, status post repair done by Dr. Alcaraz

in [place] in 2013.





Peripheral arterial disease, left lower extremity aneurysm, following with heart

and vascular care.





Hypertension, restart home medication monitor





Hyperlipidemia, monitor lipids





History of carotid stenosis followed by Dr. Alcaraz





History of intermittent hypokalemia











MOHINDER PINA MD              Mar 15, 2021 09:50

## 2021-03-16 LAB
BASOPHILS # BLD AUTO: 0.1 10^3/UL (ref 0–0.1)
BASOPHILS NFR BLD AUTO: 1 % (ref 0–10)
BUN/CREAT SERPL: 9
CALCIUM SERPL-MCNC: 9.1 MG/DL (ref 8.5–10.1)
CHLORIDE SERPL-SCNC: 103 MMOL/L (ref 98–107)
CO2 SERPL-SCNC: 23 MMOL/L (ref 21–32)
CREAT SERPL-MCNC: 0.79 MG/DL (ref 0.6–1.3)
EOSINOPHIL # BLD AUTO: 0.1 10^3/UL (ref 0–0.3)
EOSINOPHIL NFR BLD AUTO: 2 % (ref 0–10)
GFR SERPLBLD BASED ON 1.73 SQ M-ARVRAT: > 60 ML/MIN
GLUCOSE SERPL-MCNC: 128 MG/DL (ref 70–105)
HCT VFR BLD CALC: 44 % (ref 35–52)
HGB BLD-MCNC: 14.5 G/DL (ref 11.5–16)
LYMPHOCYTES # BLD AUTO: 1.2 10^3/UL (ref 1–4)
LYMPHOCYTES NFR BLD AUTO: 13 % (ref 12–44)
MAGNESIUM SERPL-MCNC: 2 MG/DL (ref 1.6–2.4)
MANUAL DIFFERENTIAL PERFORMED BLD QL: NO
MCH RBC QN AUTO: 28 PG (ref 25–34)
MCHC RBC AUTO-ENTMCNC: 33 G/DL (ref 32–36)
MCV RBC AUTO: 85 FL (ref 80–99)
MONOCYTES # BLD AUTO: 0.7 10^3/UL (ref 0–1)
MONOCYTES NFR BLD AUTO: 7 % (ref 0–12)
NEUTROPHILS # BLD AUTO: 6.8 10^3/UL (ref 1.8–7.8)
NEUTROPHILS NFR BLD AUTO: 77 % (ref 42–75)
PHOSPHATE SERPL-MCNC: 3.2 MG/DL (ref 2.3–4.7)
PLATELET # BLD: 202 10^3/UL (ref 130–400)
PMV BLD AUTO: 12.2 FL (ref 9–12.2)
POTASSIUM SERPL-SCNC: 3.7 MMOL/L (ref 3.6–5)
SODIUM SERPL-SCNC: 139 MMOL/L (ref 135–145)
WBC # BLD AUTO: 8.9 10^3/UL (ref 4.3–11)

## 2021-03-16 RX ADMIN — Medication SCH MLS/HR: at 16:57

## 2021-03-16 RX ADMIN — APIXABAN SCH MG: 5 TABLET, FILM COATED ORAL at 19:57

## 2021-03-16 RX ADMIN — METOPROLOL TARTRATE SCH MG: 25 TABLET, FILM COATED ORAL at 19:58

## 2021-03-16 RX ADMIN — HYDRALAZINE HYDROCHLORIDE PRN MG: 20 INJECTION INTRAMUSCULAR; INTRAVENOUS at 14:44

## 2021-03-16 RX ADMIN — METOPROLOL TARTRATE SCH MG: 25 TABLET, FILM COATED ORAL at 08:09

## 2021-03-16 RX ADMIN — APIXABAN SCH MG: 5 TABLET, FILM COATED ORAL at 08:09

## 2021-03-16 RX ADMIN — ASPIRIN SCH MG: 81 TABLET ORAL at 08:08

## 2021-03-16 RX ADMIN — LISINOPRIL SCH MG: 20 TABLET ORAL at 08:09

## 2021-03-16 NOTE — DIAGNOSTIC IMAGING REPORT
INDICATION:  Atrial fibrillation with rapid ventricular response.

 



TECHNIQUE:  Single view chest 3:13 AM.



CORRELATION STUDY:  03/14/2021



FINDINGS: 

Poststernotomy and coronary artery bypass changes. Cardiac

enlargement. Vasculature is improved relatively normal at

followup. Calcification of the aortic arch.   Lung fields overall

generally clear. No infiltrate.



IMPRESSION: 

1. Cardiac enlargement. Overall severity of the congestion has

improved and vasculature near normal at followup.



Dictated by: 



  Dictated on workstation # [id number]

## 2021-03-16 NOTE — CARDIOLOGY PROGRESS NOTE
Subjective


Date Seen by Provider:  Mar 16, 2021


Time Seen by Provider:  10:44


Subjective/Events-last exam


patient is laying down in bed, feeling better, had an episode of chest pain 

yesterday.


Review of Systems


General:  No Chills, No Night Sweats; Fatigue; No Malaise, No Appetite, No Other


HEENT:  No Head Aches, No Visual Changes, No Eye Pain, No Ear Pain, No Dysphasia

, No Sinus Congestion, No Post Nasal Drip, No Sore Throat, No Other


Pulmonary:  No Dyspnea, No Cough, No Pleuritic Chest Pain, No Other


Cardiovascular:  Chest Pain, Palpitations; No: Orthopnea, Paroxysmal Noc. 

Dyspnea, Edema, Lt Headedness, Other





Objective-Cardiology


Exam


Last Set of Vital Signs





Vital Signs








 3/16/21 3/16/21





 08:05 10:00


 


Temp 36.4 


 


Pulse  59


 


Resp  18


 


B/P (MAP)  181/106 (131)


 


Pulse Ox  98


 


O2 Delivery  Room Air





Capillary Refill : Less Than 3 Seconds


I&O











Intake and Output 


 


 3/16/21





 00:00


 


Intake Total 2012 ml


 


Balance 2012 ml


 


 


 


Intake Oral 1550 ml


 


IV Total 462 ml


 


# Voids 12


 


# Bowel Movements 1


 


Daily Weight Change No








General:  Alert, Oriented X3, Cooperative


HEENT:  Atraumatic, PERRLA


Neck:  Supple, No JVD, No Thyromegaly


Lungs:  Clear to Auscultation, Normal Air Movement


Heart:  Regular Rate, Normal S1, Normal S2, No Murmurs


Abdomen:  Normal Bowel Sounds, Soft, No Tenderness, No Hepatosplenomegaly, No 

Masses


Extremities:  No Clubbing, No Cyanosis, No Edema, Normal Pulses, No 

Tenderness/Swelling


Skin:  No Rashes, No Breakdown, No Significant Lesion


Neuro:  Normal Gait, Normal Speech, Strength at 5/5 X4 Ext, Normal Tone, 

Sensation Intact


Psych/Mental Status:  Mental Status NL, Mood NL





Results


Lab


Laboratory Tests


3/16/21 02:51














A/P-Cardiology


Admission Diagnosis


Paroxysmal atrial fibrillation


Coronary artery disease


Hypertension


Hyperlipidemia





Assessment/Plan


Paroxysmal atrial fibrillation, and out of atrial fibrillation alternating with 

sinus rhythm, I started her on amiodarone bolus and a drip and appeared to be 

responded well.





Chest pain, mainly occurring during the episode of atrial fibrillation with 

rapid ventricular response.  Currently chest pain-free.  Continue to monitor





[id number] score of 5, yearly risk of stroke without oral anticoagulation is 

6.7 percent.  Starting on Eliquis





Mild elevation in troponin, probably secondary to tachycardia, she has 

underlying extensive coronary artery disease not amendable to intervention





Coronary artery disease history of CABG done in 2001, had a cardiac 

catheterization in February 2016 showed the LAD has 6070 percent stenosis, 

occluded at the midportion, the distal LAD is protected by LIMA that is patent, 

high diagonal branch stent stent known to be Promus 2.2 x 12 mm placed in 2012, 

the circumflex has a patent stent in the proximal portion, RCA is occluded 

proximally protected by patent vein graft to the distal right coronary artery.  

Patient had an abnormal stress test in April 2018, attempt for cardiac 

catheterization has failed due to the extensive disease in her thoracic and 

abdominal aorta.  Medical therapy is recommended





History of thoracic and abdominal aneurysm, status post repair done by Dr. Alcaraz

in [place] in 2013.





Peripheral arterial disease, left lower extremity aneurysm, following with heart

and vascular care.





Hypertension, restart home medication monitor





Hyperlipidemia, monitor lipids





History of carotid stenosis followed by Dr. Alcaraz





History of intermittent hypokalemia











MOHINDER PINA MD              Mar 16, 2021 10:46

## 2021-03-16 NOTE — PHYSICIAN QUERY CLARIFICATION
Physician Query-General


Query to Physician:


The medical record reflects the following clinical scenario:





History/Risk factors:  Extensive CAD, HTN, 





Clinical Findings:  chest pain/pressure prior to admission, Troponin 0.028 

increased to 0.050  underlying extensive coronary artery disease not amendable 

to intervention, 





Treatment:  enoxaparin, Cardiology Consult





Question:  What condition best reflects the above clinical scenario?


Please document response in the Progress notes or Discharge Summary.








1. NSTEMI present on admission 


2. Other , with explanation of the clinical findings


3. Clinically undetermined, no explanation for the clinical findings








Please remember a lack of response to the above will prompt a phone page by 

CDI/coding staff





In responding to this query, please exercise your independent professional 

judgment.  The purpose of this communication is to more accurately reflect the 

complexity of your patients condition. The fact that a question is asked does 

not imply that any particular answer is desired or expected.  





   





Thank you for timely response to this clarification.   


      


Flory Carter, MSN, RN


RN Specialist-Clinical Doc Improvement 


[facility] [address]


[place] [address] [facility]


t: [phone] | f: [phone]


If you are unable to reach me at my extension, I may be working from home. 

Please contact me at [phone]





PHYSICIAN RESPONSE:


Based on the clinical findings in the record, please respond to the query above 

on this document as an addendum. 








Physician Response:


Physician Response


NSTEMI, type II














If you have questions please contact:


                   


:


Ext:





Thank you for your time and cooperation.


Clinical /








*********************This is a permanent part of the medical 

record*******************











FLORY CARTER                   Mar 16, 2021 17:10


OMER BOJORQUEZ MD              Mar 23, 2021 20:16

## 2021-03-17 VITALS — DIASTOLIC BLOOD PRESSURE: 93 MMHG | SYSTOLIC BLOOD PRESSURE: 153 MMHG

## 2021-03-17 LAB
BASOPHILS # BLD AUTO: 0.1 10^3/UL (ref 0–0.1)
BASOPHILS NFR BLD AUTO: 1 % (ref 0–10)
BUN/CREAT SERPL: 11
CALCIUM SERPL-MCNC: 8.9 MG/DL (ref 8.5–10.1)
CHLORIDE SERPL-SCNC: 104 MMOL/L (ref 98–107)
CO2 SERPL-SCNC: 18 MMOL/L (ref 21–32)
CREAT SERPL-MCNC: 0.84 MG/DL (ref 0.6–1.3)
EOSINOPHIL # BLD AUTO: 0.2 10^3/UL (ref 0–0.3)
EOSINOPHIL NFR BLD AUTO: 2 % (ref 0–10)
GFR SERPLBLD BASED ON 1.73 SQ M-ARVRAT: > 60 ML/MIN
GLUCOSE SERPL-MCNC: 114 MG/DL (ref 70–105)
HCT VFR BLD CALC: 46 % (ref 35–52)
HGB BLD-MCNC: 15.2 G/DL (ref 11.5–16)
LYMPHOCYTES # BLD AUTO: 1.1 10^3/UL (ref 1–4)
LYMPHOCYTES NFR BLD AUTO: 11 % (ref 12–44)
MAGNESIUM SERPL-MCNC: 2.5 MG/DL (ref 1.6–2.4)
MANUAL DIFFERENTIAL PERFORMED BLD QL: NO
MCH RBC QN AUTO: 28 PG (ref 25–34)
MCHC RBC AUTO-ENTMCNC: 33 G/DL (ref 32–36)
MCV RBC AUTO: 85 FL (ref 80–99)
MONOCYTES # BLD AUTO: 0.9 10^3/UL (ref 0–1)
MONOCYTES NFR BLD AUTO: 9 % (ref 0–12)
NEUTROPHILS # BLD AUTO: 7.4 10^3/UL (ref 1.8–7.8)
NEUTROPHILS NFR BLD AUTO: 77 % (ref 42–75)
PHOSPHATE SERPL-MCNC: 4 MG/DL (ref 2.3–4.7)
PLATELET # BLD: 199 10^3/UL (ref 130–400)
PMV BLD AUTO: 11.8 FL (ref 9–12.2)
POTASSIUM SERPL-SCNC: 4.3 MMOL/L (ref 3.6–5)
SODIUM SERPL-SCNC: 138 MMOL/L (ref 135–145)
WBC # BLD AUTO: 9.6 10^3/UL (ref 4.3–11)

## 2021-03-17 RX ADMIN — HYDRALAZINE HYDROCHLORIDE PRN MG: 20 INJECTION INTRAMUSCULAR; INTRAVENOUS at 05:49

## 2021-03-17 RX ADMIN — METOPROLOL TARTRATE SCH MG: 25 TABLET, FILM COATED ORAL at 08:14

## 2021-03-17 RX ADMIN — APIXABAN SCH MG: 5 TABLET, FILM COATED ORAL at 08:14

## 2021-03-17 RX ADMIN — ASPIRIN SCH MG: 81 TABLET ORAL at 08:14

## 2021-03-17 RX ADMIN — LISINOPRIL SCH MG: 20 TABLET ORAL at 08:14

## 2021-03-17 NOTE — DISCHARGE SUMMARY
Discharge Summary


Hospital Course


Was the Problem List Reviewed?:  Yes


Problems/Dx:  


(1) Atrial fibrillation with RVR


Status:  Acute


Hospital Course


Date of Admission: Mar 14, 2021 at 23:35 


Admission Diagnosis :  New onset paroxysmal atrial fibrillation with RVR





Family Physician/Provider: Tish Araiza DO  





Date of Discharge: 3/17/21 


Discharge Diagnosis:  New onset paroxysmal atrial fibrillation with RVR








Hospital Course:


Jodie Reaves is a 75-year-old female with past medical history of coronary artery

disease status post CABG who was admitted with new onset atrial fibrillation 

with rapid ventricular response.  She was started on IV Cardizem.  She continued

to have issues with A. fib with RVR and was started on IV amiodarone.  Her heart

rates improved and she was transitioned to oral metoprolol.  She was started on 

anticoagulation with Eliquis.  Her troponin was mildly elevated and this was 

thought to be due to tachycardia.  She was discharged home in stable condition. 

She should follow up with her primary care physician and cardiology as 

scheduled.














Labs and Pending Lab Test:


Laboratory Tests


3/17/21 02:35: 


White Blood Count 9.6, Red Blood Count 5.45H, Hemoglobin 15.2, Hematocrit 46, 

Mean Corpuscular Volume 85, Mean Corpuscular Hemoglobin 28, Mean Corpuscular 

Hemoglobin Concent 33, Red Cell Distribution Width 13.1, Platelet Count 199, 

Mean Platelet Volume 11.8, Immature Granulocyte % (Auto) 0, Neutrophils (%) 

(Auto) 77H, Lymphocytes (%) (Auto) 11L, Monocytes (%) (Auto) 9, Eosinophils (%) 

(Auto) 2, Basophils (%) (Auto) 1, Neutrophils # (Auto) 7.4, Lymphocytes # (Auto)

1.1, Monocytes # (Auto) 0.9, Eosinophils # (Auto) 0.2, Basophils # (Auto) 0.1, 

Immature Granulocyte # (Auto) 0.0, Sodium Level 138, Potassium Level 4.3, 

Chloride Level 104, Carbon Dioxide Level 18L, Anion Gap 16H, Blood Urea Nitrogen

9, Creatinine 0.84, Estimat Glomerular Filtration Rate > 60, BUN/Creatinine 

Ratio 11, Glucose Level 114H, Calcium Level 8.9, Phosphorus Level 4.0, Magnesium

Level 2.5H





Microbiology


3/15/21 MRSA Screen - Final, Complete


          MRSA not isolated





Home Meds


Active


Metoprolol Succinate 100 Mg Tab.er.24h 100 Mg PO BID 90 Days


Eliquis (Apixaban) 5 Mg Tablet 5 Mg PO BID 30 Days


Reported


Aspirin EC (Aspirin) 325 Mg Tablet.dr 325 Mg PO DAILY


Fish Oil 1,200 mg Softgel (Omega-3S/Dha/Epa/Fish Oil) 1 Each Capsule 1 Each PO 

DAILY


Vitamin D3 (Cholecalciferol (Vitamin D3)) 25 Mcg Capsule 25 Mcg PO DAILY


Metoprolol Succinate 50 Mg Tab.er.24h 50 Mg PO 1400


Atorvastatin Calcium 20 Mg Tablet 20 Mg PO 1400


Amlodipine Besylate 5 Mg Tablet 5 Mg PO 1400


     LAST FILLED 10- #90/90 DAY SUPPLY


Minoxidil 10 Mg Tab 10 Mg PO BID


     LAST FILLED 09- #180/90 DAY SUPPLY


Lisinopril 20 Mg Tablet 20 Mg PO 1400


Assessment/Pt Instructions


Take medications as prescribed.  Follow-up with your primary care doctor and 

cardiology.


Discharge Planning:  >30 minutes discharge planning





Discharge Instructions


Discharge Diet:  No Restrictions


Activity as Tolerated:  Yes


Consultations


Cardiology





Discharge Physical Examination


Vital Signs





Vital Signs








  Date Time  Temp Pulse Resp B/P (MAP) Pulse Ox O2 Delivery O2 Flow Rate FiO2


 


3/17/21 13:36  65  153/93 (113)    


 


3/17/21 12:21 36.8  18  95 Room Air  








General Appearance:  No Apparent Distress, WD/WN


Respiratory:  Lungs Clear, Normal Breath Sounds, No Respiratory Distress


Cardiovascular:  No Edema, No Murmur, Irregularly Irregular


Gastrointestinal:  Normal Bowel Sounds, Non Tender, Soft


Extremity:  Normal Inspection, Non Tender, No Pedal Edema


Skin:  Normal Color, Warm/Dry


Neurologic/Psychiatric:  Alert, Oriented x3, No Motor/Sensory Deficits, Normal 

Mood/Affect


Allergies:  


Coded Allergies:  


     NKANo Known Allergies (Verified  Allergy, Unknown, 4/24/07)





Copy


Copies To 1:   TISH ARAIZA DO





Discharge Summary


Date of Admission


Mar 14, 2021 at 23:35


Date of Discharge





Discharge Date:  Mar 17, 2021


Discharge Time:  14:10


Admission Diagnosis


Atrial fibrillation with rapid ventricular response


Consults/Procedures


Consulations


Cardiology





Discharge Diagnosis


Atrial fibrillation with rapid ventricular response





(1) Atrial fibrillation with RVR


Status:  Acute











OMER BOJORQUEZ MD              Mar 17, 2021 14:10

## 2021-03-17 NOTE — PROGRESS NOTE - CARDIOLOGY
Cardiology SOAP Progress Note


Subjective:


No cp or palp or syncope


Some shortness of breath with activity


No n/v/d


Gen weakness and malaise


No focal weakness





Objective:


I&O/Vital Signs











 3/17/21 3/17/21 3/17/21 3/17/21





 01:00 04:00 06:52 06:58


 


Temp  36.8  


 


Pulse 54 74 80 94


 


Resp  18  


 


B/P (MAP)  181/97 (125)  


 


Pulse Ox  96  


 


O2 Delivery  Room Air  


 


    





 3/17/21 3/17/21 3/17/21 3/17/21





 07:27 08:31 12:21 12:24


 


Temp 36.4  36.8 


 


Pulse 71  70 73


 


Resp 16  18 


 


B/P (MAP) 152/81 (104)  183/92 (122) 


 


Pulse Ox 93  95 


 


O2 Delivery Room Air Room Air Room Air 














 3/17/21





 00:00


 


Intake Total 1409 ml


 


Output Total 900 ml


 


Balance 509 ml








Weight (Pounds):  140


Weight (Ounces):  0.0


Weight (Calculated Kilograms):  63.916522


Constitutional:  AAO x 3, well-developed, other (thin-appearing)


Respiratory:  No accessory muscle use; other (fair bilateral air entry, 

prolonged exp phase)


Cardiovascular:  regular rate-rhythm, S1 and S2, systolic murmur (soft MARIAN at 

card base)


Gastrointestional:  No tender; soft; No guarding, No rebound; audible bowel 

sounds


Extremities:  No clubbing, No cyanosis, No significant edema


Neurologic/Psychiatric:  oriented x 3, other (moves all limbs equally)


Skin:  No rash on exposed areas, No ulcerations on exposed areas





Results/Procedures:


Labs


Laboratory Tests


3/17/21 02:35: 


White Blood Count 9.6, Red Blood Count 5.45H, Hemoglobin 15.2, Hematocrit 46, 

Mean Corpuscular Volume 85, Mean Corpuscular Hemoglobin 28, Mean Corpuscular 

Hemoglobin Concent 33, Red Cell Distribution Width 13.1, Platelet Count 199, 

Mean Platelet Volume 11.8, Immature Granulocyte % (Auto) 0, Neutrophils (%) 

(Auto) 77H, Lymphocytes (%) (Auto) 11L, Monocytes (%) (Auto) 9, Eosinophils (%) 

(Auto) 2, Basophils (%) (Auto) 1, Neutrophils # (Auto) 7.4, Lymphocytes # (Auto)

1.1, Monocytes # (Auto) 0.9, Eosinophils # (Auto) 0.2, Basophils # (Auto) 0.1, 

Immature Granulocyte # (Auto) 0.0, Sodium Level 138, Potassium Level 4.3, 

Chloride Level 104, Carbon Dioxide Level 18L, Anion Gap 16H, Blood Urea Nitrogen

9, Creatinine 0.84, Estimat Glomerular Filtration Rate > 60, BUN/Creatinine 

Ratio 11, Glucose Level 114H, Calcium Level 8.9, Phosphorus Level 4.0, Magnesium

Level 2.5H





Microbiology


3/15/21 MRSA Screen - Final, Complete


          MRSA not isolated








A/P:


Assessment:





Paroxysmal atrial fibrillation first diagnosed at this admission (March 2021). 

Currently NSR





Chest discomfort and mild troponin elevation, type 2 MI due A Fib with RVR





[id number] score of 5, yearly risk of stroke without oral anticoagulation is 6

.7 percent.  Starting on Eliquis





Coronary artery disease 


- history of CABG done in 2001


- cardiac catheterization in February 2016: LAD occluded at the midportion, the 

distal LAD protected by patent LIMA; high diagonal branch stent stent known to 

be Promus 2.2 x 12 mm placed in 2012; the circumflex had a patent stent in the 

proximal portion; RCA was occluded proximally protected by patent vein graft to 

the distal right coronary artery.  


- Patient had an abnormal stress test in April 2018, attempt for cardiac 

catheterization failed due to the extensive disease in her thoracic and 

abdominal aorta.  Medical therapy recommended





History of thoracic and abdominal aneurysm, status post repair done by Dr. Alcaraz

in [place] in 2013.





Peripheral arterial disease, left lower extremity aneurysm, following with [facility] (Dr Alcaraz).





Hypertension





Hyperlipidemia





History of carotid stenosis, followed by Dr. Alcaraz





History of intermittent hypokalemia


Plan:





* Complex management due to advanced CV disease and limited access, now further 

  complicated by PAF


* D/c short-acting dilt


* Start long-acting beta-blocker


* Continue low-dose ASA for CAD


* Continue stroke prophylaxis with apixaban


* Monitor labs











DAVID YOO MD [facility]   Mar 17, 2021 13:08

## 2021-09-22 ENCOUNTER — HOSPITAL ENCOUNTER (INPATIENT)
Dept: HOSPITAL 75 - ER | Age: 76
LOS: 2 days | Discharge: HOME | DRG: 282 | End: 2021-09-24
Attending: FAMILY MEDICINE | Admitting: FAMILY MEDICINE
Payer: MEDICARE

## 2021-09-22 VITALS — HEIGHT: 60 IN | WEIGHT: 128.31 LBS | BODY MASS INDEX: 25.19 KG/M2

## 2021-09-22 DIAGNOSIS — I73.9: ICD-10-CM

## 2021-09-22 DIAGNOSIS — I25.10: ICD-10-CM

## 2021-09-22 DIAGNOSIS — Z79.82: ICD-10-CM

## 2021-09-22 DIAGNOSIS — I21.A1: ICD-10-CM

## 2021-09-22 DIAGNOSIS — I48.0: Primary | ICD-10-CM

## 2021-09-22 DIAGNOSIS — Z20.822: ICD-10-CM

## 2021-09-22 DIAGNOSIS — Z87.891: ICD-10-CM

## 2021-09-22 DIAGNOSIS — I25.2: ICD-10-CM

## 2021-09-22 DIAGNOSIS — E78.5: ICD-10-CM

## 2021-09-22 DIAGNOSIS — E78.00: ICD-10-CM

## 2021-09-22 DIAGNOSIS — G47.33: ICD-10-CM

## 2021-09-22 DIAGNOSIS — I65.29: ICD-10-CM

## 2021-09-22 DIAGNOSIS — I10: ICD-10-CM

## 2021-09-22 DIAGNOSIS — Z95.5: ICD-10-CM

## 2021-09-22 DIAGNOSIS — Z79.899: ICD-10-CM

## 2021-09-22 DIAGNOSIS — F41.9: ICD-10-CM

## 2021-09-22 DIAGNOSIS — Z79.01: ICD-10-CM

## 2021-09-22 DIAGNOSIS — Z95.1: ICD-10-CM

## 2021-09-22 LAB
ALBUMIN SERPL-MCNC: 4 GM/DL (ref 3.2–4.5)
ALP SERPL-CCNC: 75 U/L (ref 40–136)
ALT SERPL-CCNC: 39 U/L (ref 0–55)
APTT BLD: 29 SEC (ref 24–35)
BASOPHILS # BLD AUTO: 0.1 10^3/UL (ref 0–0.1)
BASOPHILS NFR BLD AUTO: 1 % (ref 0–10)
BILIRUB SERPL-MCNC: 0.6 MG/DL (ref 0.1–1)
BUN/CREAT SERPL: 27
CALCIUM SERPL-MCNC: 9.1 MG/DL (ref 8.5–10.1)
CHLORIDE SERPL-SCNC: 107 MMOL/L (ref 98–107)
CK MB SERPL-MCNC: 1.3 NG/ML (ref ?–6.6)
CK SERPL-CCNC: 63 U/L (ref 29–168)
CO2 SERPL-SCNC: 24 MMOL/L (ref 21–32)
CREAT SERPL-MCNC: 0.84 MG/DL (ref 0.6–1.3)
EOSINOPHIL # BLD AUTO: 0.1 10^3/UL (ref 0–0.3)
EOSINOPHIL NFR BLD AUTO: 1 % (ref 0–10)
GFR SERPLBLD BASED ON 1.73 SQ M-ARVRAT: 66 ML/MIN
GLUCOSE SERPL-MCNC: 127 MG/DL (ref 70–105)
HCT VFR BLD CALC: 43 % (ref 35–52)
HGB BLD-MCNC: 13.4 G/DL (ref 11.5–16)
INR PPP: 1.4 (ref 0.8–1.4)
LYMPHOCYTES # BLD AUTO: 1.6 10^3/UL (ref 1–4)
LYMPHOCYTES NFR BLD AUTO: 15 % (ref 12–44)
MAGNESIUM SERPL-MCNC: 2.2 MG/DL (ref 1.6–2.4)
MANUAL DIFFERENTIAL PERFORMED BLD QL: NO
MCH RBC QN AUTO: 28 PG (ref 25–34)
MCHC RBC AUTO-ENTMCNC: 32 G/DL (ref 32–36)
MCV RBC AUTO: 89 FL (ref 80–99)
MONOCYTES # BLD AUTO: 0.8 10^3/UL (ref 0–1)
MONOCYTES NFR BLD AUTO: 7 % (ref 0–12)
NEUTROPHILS # BLD AUTO: 8.3 10^3/UL (ref 1.8–7.8)
NEUTROPHILS NFR BLD AUTO: 76 % (ref 42–75)
PLATELET # BLD: 188 10^3/UL (ref 130–400)
PMV BLD AUTO: 11.6 FL (ref 9–12.2)
POTASSIUM SERPL-SCNC: 4.4 MMOL/L (ref 3.6–5)
PROT SERPL-MCNC: 7.3 GM/DL (ref 6.4–8.2)
PROTHROMBIN TIME: 17.5 SEC (ref 12.2–14.7)
SODIUM SERPL-SCNC: 140 MMOL/L (ref 135–145)
TSH SERPL DL<=0.05 MIU/L-ACNC: 1.75 UIU/ML (ref 0.35–4.94)
WBC # BLD AUTO: 10.9 10^3/UL (ref 4.3–11)

## 2021-09-22 PROCEDURE — 87081 CULTURE SCREEN ONLY: CPT

## 2021-09-22 PROCEDURE — 82550 ASSAY OF CK (CPK): CPT

## 2021-09-22 PROCEDURE — 93041 RHYTHM ECG TRACING: CPT

## 2021-09-22 PROCEDURE — 85730 THROMBOPLASTIN TIME PARTIAL: CPT

## 2021-09-22 PROCEDURE — 84443 ASSAY THYROID STIM HORMONE: CPT

## 2021-09-22 PROCEDURE — 93005 ELECTROCARDIOGRAM TRACING: CPT

## 2021-09-22 PROCEDURE — 71045 X-RAY EXAM CHEST 1 VIEW: CPT

## 2021-09-22 PROCEDURE — 80053 COMPREHEN METABOLIC PANEL: CPT

## 2021-09-22 PROCEDURE — 36415 COLL VENOUS BLD VENIPUNCTURE: CPT

## 2021-09-22 PROCEDURE — 83735 ASSAY OF MAGNESIUM: CPT

## 2021-09-22 PROCEDURE — 82553 CREATINE MB FRACTION: CPT

## 2021-09-22 PROCEDURE — 84100 ASSAY OF PHOSPHORUS: CPT

## 2021-09-22 PROCEDURE — 85027 COMPLETE CBC AUTOMATED: CPT

## 2021-09-22 PROCEDURE — 83880 ASSAY OF NATRIURETIC PEPTIDE: CPT

## 2021-09-22 PROCEDURE — 85610 PROTHROMBIN TIME: CPT

## 2021-09-22 PROCEDURE — 87636 SARSCOV2 & INF A&B AMP PRB: CPT

## 2021-09-22 PROCEDURE — 84484 ASSAY OF TROPONIN QUANT: CPT

## 2021-09-22 PROCEDURE — 85025 COMPLETE CBC W/AUTO DIFF WBC: CPT

## 2021-09-22 RX ADMIN — Medication SCH MLS/HR: at 22:02

## 2021-09-22 RX ADMIN — APIXABAN SCH MG: 5 TABLET, FILM COATED ORAL at 19:24

## 2021-09-22 RX ADMIN — Medication SCH MLS/HR: at 12:40

## 2021-09-22 NOTE — CONSULTATION-CARDIOLOGY
HPI-Cardiology


Cardiology Consultation:


Date of Consultation


21


Time Seen by a Provider:  15:00


Date of Admission


21


Attending Physician


Fatimah Engle MD


Admitting Physician


Adams Araiza DO


Consulting Physician


Jean Huff MD





HPI:


Chief Complaint:


A-fb with RVR


Ms. Reaves is a 76 yr old female admitted to ICU 11 from the ED with a-fib with 

RVR. She reports she has had an occ feeling of palpitations, but typically they 

last only a few minutes.  Today she reports she went to eat with her sister and 

when leaving the restaurant she began to feel her heart beat fast and hard.  She

reports she was feeling weak, lightheaded and SOB.  No report of syncope or near

syncope.  No c/o CP.  She reports chronic mild to mod bilat ankle swelling, R>L,

which is unchanged.  She denies missing any medication doses including OAC.  She

does not report any n/v/d.  No c/o fever or chills.  She reports she is feeling 

better at this time.





Review of Systems-Cardiology


Review of Systems


Constitutional:  No chills, No fever; lightheadedness


Eyes:  No vision change


Ears/Nose/Throat:  No epistaxis, No recent hearing loss


Respiratory:  As described under HPI


Cardiovascular:  As described under HPI


Gastrointestinal:  No constipation, No diarrhea, No nausea, No vomiting


Genitourinary:  No dysuria, No hematuria


Musculoskeletal:  no symptoms reported


Skin:  No rash on exposed areas, No ulcerations on exposed areas


Psychiatric/Neurological:  anxiety; No depression, No seizure, No focal 

weakness, No syncope


Hematologic:  No bleeding abnormalities





PMH-Social-Family Hx


Patient Social History


Smoking Status:  Former Smoker


2nd Hand Smoke Exposure:  No


Have you traveled recently?:  No


Alcohol Use?:  No


Pt feels they are or have been:  No





Immunizations Up To Date


Tetanus Booster (TDap):  More than 5yrs


Date of Pneumonia Vaccine:  2007





Past Medical History


PMH


As described under Assessment.





Family Medical History


Family Medical History:  


She reports her father and mother both had strokes.  She does not report


any premature CAD or SCD.


Family History:  


FHx: stroke


  19 FATHER


  19 MOTHER





Allergies and Home Medications


Allergies


Coded Allergies:  


     NKANo Known Allergies (Verified  Allergy, Unknown, 07)





Patient Home Medication List


Amlodipine Besylate (Amlodipine Besylate) 5 Mg Tablet, 5 MG PO 1400, (Reported)


   Entered as Reported by: ELIZABETH MCNALLY on 18 0940


   Last Action: Held


Apixaban (Eliquis) 5 Mg Tablet, 5 MG PO BID, (Reported)


   Entered as Reported by: IGNACIO CARPENTER on 21 1621


   Last Action: Held


Aspirin (Aspirin EC) 81 Mg Tablet., 81 MG PO DAILY, (Reported)


   Entered as Reported by: IGNACIO CARPENTER on 21 155


   Last Action: Held


Atorvastatin Calcium (Atorvastatin Calcium) 20 Mg Tablet, 20 MG PO 1400, 

(Reported)


   Entered as Reported by: IGNACIO CARPENTER on 3/15/21 113


   Last Action: Continued


Cholecalciferol (Vitamin D3) (Vitamin D3) 25 Mcg Capsule, 25 MCG PO DAILY, 

(Reported)


   Entered as Reported by: IGNACIO CARPENTER on 3/15/21 1137


   Last Action: Converted


Lisinopril (Lisinopril) 20 Mg Tablet, 20 MG PO 1400, (Reported)


   Entered as Reported by: IRWIN RUTHERFORD on 16 2216


   Last Action: Held


Metoprolol Succinate (Metoprolol Succinate) 50 Mg Tab.er.24h, 50 MG PO 1400, 

(Reported)


   Entered as Reported by: IGNACIO CARPENTER on 21 155


   Last Action: Held


Minoxidil (Minoxidil) 10 Mg Tab, 10 MG PO BID, (Reported)


   Entered as Reported by: ELIZABETH MCNALLY on 18 0940


   Last Action: Held


Omega-3S/Dha/Epa/Fish Oil (Fish Oil 1,200 mg Softgel) 1 Each Capsule, 1 EACH PO 

1400, (Reported)


   Entered as Reported by: IGNACIO CARPENTER on 3/15/21 1137


   Last Action: Held


Discontinued Medications


Apixaban (Eliquis) 5 Mg Tablet, 5 MG PO BID


   Discontinued Reason: No Longer Taking


   Prescribed by: OMER BOJORQUEZ on 3/18/21 0950


   Last Action: Discontinued


Aspirin (Aspirin EC) 325 Mg Tablet., 325 MG PO DAILY, (Reported)


   Discontinued Reason: No Longer Taking


   Entered as Reported by: IGNACIO CARPENTER on 3/15/21 1137


   Last Action: Discontinued


Metoprolol Succinate (Metoprolol Succinate) 100 Mg Tab.er.24h, 100 MG PO BID


   Discontinued Reason: No Longer Taking


   Prescribed by: OMER BOJORQUEZ on 3/17/21 5494


   Last Action: Discontinued





Physical Exam-Cardiology


Physical Exam


Vital Signs/I&O











 21





 21:00 22:00 23:00 00:00


 


Pulse 70 86 73 


 


Resp 18 17 17 


 


B/P (MAP) 115/77 115/57 111/70 


 


Pulse Ox 95 92 93 94


 


O2 Delivery Room Air Room Air Room Air Room Air





 21





 00:00 00:00 01:00 01:00


 


Temp  36.9  


 


Pulse 80  70 80


 


Resp 14   16


 


B/P (MAP) 135/71   134/82


 


Pulse Ox 91   91


 


O2 Delivery Room Air Room Air  Room Air


 


    





 21





 02:00 03:00 03:50 03:55


 


Temp   36.5 


 


Pulse 81 88  


 


Resp 22 17  


 


B/P (MAP) 129/84 135/86  


 


Pulse Ox 95 92  95


 


O2 Delivery Room Air Room Air Room Air Room Air


 


    





 21





 04:00 05:00 06:00 07:58


 


Temp    36.8


 


Pulse 84 86 96 


 


Resp 18 16 16 


 


B/P (MAP) 125/88 126/91 150/95 


 


Pulse Ox 94 92 92 


 


O2 Delivery Room Air Room Air Room Air 














 21





 00:00


 


Intake Total 500 ml


 


Output Total 1475 ml


 


Balance -975 ml





Capillary Refill : Less Than 3 Seconds


Constitutional:  AAO x 3, well-developed, well-nourished


HEENT:  PERRL, hearing is well preserved, oral hygience is good


Neck:  No carotid bruit; carotid pulses are 2 + bilaterally


Respiratory:  No accessory muscle use, No respiratory distress; chest expansion 

is symmetric, chest is bilaterally symmetric, lungs clear to auscultation


Cardiovascular:  irregularly irregular; No JVD; tachycardia


Gastrointestinal:  No tender; soft, round, audible bowel sounds


Extremities:  other (mild right ankle swelling)


Neurologic/Psychiatric:  grossly intact (moves all extremities)


Skin:  No rash on exposed areas, No ulcerations on exposed areas





Data Review


Labs


Laboratory Tests


21 12:27: 


White Blood Count 10.9, Red Blood Count 4.77, Hemoglobin 13.4, Hematocrit 43, 

Mean Corpuscular Volume 89, Mean Corpuscular Hemoglobin 28, Mean Corpuscular 

Hemoglobin Concent 32, Red Cell Distribution Width 15.2H, Platelet Count 188, 

Mean Platelet Volume 11.6, Immature Granulocyte % (Auto) 0, Neutrophils (%) 

(Auto) 76H, Lymphocytes (%) (Auto) 15, Monocytes (%) (Auto) 7, Eosinophils (%) 

(Auto) 1, Basophils (%) (Auto) 1, Neutrophils # (Auto) 8.3H, Lymphocytes # 

(Auto) 1.6, Monocytes # (Auto) 0.8, Eosinophils # (Auto) 0.1, Basophils # (Auto)

0.1, Immature Granulocyte # (Auto) 0.0, Prothrombin Time 17.5H, INR Comment 1.4,

Activated Partial Thromboplast Time 29, Sodium Level 140, Potassium Level 4.4, 

Chloride Level 107, Carbon Dioxide Level 24, Anion Gap 9, Blood Urea Nitrogen 

23H, Creatinine 0.84, Estimat Glomerular Filtration Rate 66, BUN/Creatinine 

Ratio 27, Glucose Level 127H, Calcium Level 9.1, Corrected Calcium 9.1, 

Magnesium Level 2.2, Total Bilirubin 0.6, Aspartate Amino Transf (AST/SGOT) 30, 

Alanine Aminotransferase (ALT/SGPT) 39, Alkaline Phosphatase 75, Total Creatine 

Kinase 63, Creatine Kinase MB 1.3, Troponin I 0.032H, B-Type Natriuretic Peptide

921.8H, Total Protein 7.3, Albumin 4.0, TSH Cascade Testing 1.75


21 13:32: SARS-CoV-2 RNA (RT-PCR) Not Detected


21 04:50: 


White Blood Count 7.6, Red Blood Count 4.57, Hemoglobin 12.4, Hematocrit 39, 

Mean Corpuscular Volume 86, Mean Corpuscular Hemoglobin 27, Mean Corpuscular 

Hemoglobin Concent 32, Red Cell Distribution Width 15.1H, Platelet Count 181, 

Mean Platelet Volume 11.9, Immature Granulocyte % (Auto) 0, Neutrophils (%) 

(Auto) 73, Lymphocytes (%) (Auto) 17, Monocytes (%) (Auto) 8, Eosinophils (%) 

(Auto) 2, Basophils (%) (Auto) 1, Neutrophils # (Auto) 5.5, Lymphocytes # (Auto)

1.3, Monocytes # (Auto) 0.6, Eosinophils # (Auto) 0.1, Basophils # (Auto) 0.0, 

Immature Granulocyte # (Auto) 0.0, Sodium Level 140, Potassium Level 4.1, 

Chloride Level 103, Carbon Dioxide Level 27, Anion Gap 10, Blood Urea Nitrogen 

16, Creatinine 0.82, Estimat Glomerular Filtration Rate 67, BUN/Creatinine Ratio

20, Glucose Level 120H, Calcium Level 9.0, Corrected Calcium 9.2, Magnesium 

Level 2.1, Total Bilirubin 1.0, Aspartate Amino Transf (AST/SGOT) 20, Alanine 

Aminotransferase (ALT/SGPT) 34, Alkaline Phosphatase 71, Total Protein 6.2L, 

Albumin 3.7, Phosphorus Level 3.7








Radiology


NAME:   ADAMS REAVES


MED REC#:   [id number]


ACCOUNT#:   [id number]


PT STATUS:   REG ER


:   1945


PHYSICIAN:   GABRIELE FELIPE DO


ADMIT DATE:   21/ER


***Draft***


Date of Exam:21





CHEST 1 VIEW, AP/PA ONLY








INDICATION: Atrial fibrillation. Shortness of air.





COMPARISON: 2021.





FINDINGS: Single frontal radiographic view of the chest was


obtained and demonstrates moderate-to-marked cardiomegaly.


Pulmonary vasculature is slightly prominent as well. Lungs,


however, are clear. Note is made of hyperinflation with


flattening of the hemidiaphragms. There is no large effusion or


pneumothorax. Osseous structures show no gross acute


abnormalities. Sternotomy wires and calcified aortic


atherosclerosis are present.





IMPRESSION:


1. Moderate-to-marked cardiomegaly with mild vascular congestion.


2. No evidence of failure or focal infiltrate.


3. Probable background COPD changes.





  Dictated on workstation # [id number]








Dict:   21 1306


Trans:   21 1308


 [id number]





Interpreted by:     WIL SALCIDO MD


Electronically signed by:





ECG Impression


ECG


Initial ECG Impression:  Atrial Fibrillation w/RVR





A/P-Cardiology


Assessment/Admission Diagnosis


Paroxysmal atrial fibrillation - currently a-fib with RVR


- first diagnosed 2021 hospital admission


- OAC with Eliquis





Minimally elevated troponin (Type 2 MI)


- likely secondary to a-fib with RVR





Coronary artery disease


- history of CABG done in 


- cardiac catheterization in 2016: LAD occluded at the midportion, the 

distal LAD protected by patent LIMA; high diagonal branch stent stent known to 

be Promus 2.2 x 12 mm placed in ; the circumflex had a patent stent in the 

proximal portion; RCA was occluded proximally protected by patent vein graft to 

the distal right coronary artery.


- Patient had an abnormal stress test in 2018, attempt for cardiac 

catheterization failed due to the extensive disease in her thoracic and 

abdominal aorta. Medical therapy recommended


- Echocardiogram of 3-15-21 by Dr. Cabrera showed LVEF 55-65%.  LA mod dilated.  

RA dilated.  PASP 30-35 mmHg





History of thoracic and abdominal aneurysm


- status post repair done by Dr. Alcaraz in [place] in .





Peripheral arterial disease 


- left lower extremity aneurysm, following [facility] (Dr Alcaraz).





Hypertension





Hyperlipidemia





History of carotid stenosis


-  followed by Dr. Alcaraz at [facility] 





History of intermittent hypokalemia





Discussion and Recomendations


PAF - currently a-fib with RVR


Minimial troponin elevation, likely Type 2 MI, secondary to a-fib with RVR


Continue cardizem gtt for rate control


Resume OAC with Eliquis


Resume ASA d/t known h/o CAD


BB for rate control - adjust as indicated


Monitor lab closely


Replace electrolytes as indicated


Further recs will be based on her hospital course


We would like to thank medical services for this consult











LORENZO OLIVEIRA           Sep 22, 2021 14:58

## 2021-09-22 NOTE — DIAGNOSTIC IMAGING REPORT
INDICATION: Atrial fibrillation. Shortness of air.



COMPARISON: 03/16/2021.



FINDINGS: Single frontal radiographic view of the chest was

obtained and demonstrates moderate-to-marked cardiomegaly.

Pulmonary vasculature is slightly prominent as well. Lungs,

however, are clear. Note is made of hyperinflation with

flattening of the hemidiaphragms. There is no large effusion or

pneumothorax. Osseous structures show no gross acute

abnormalities. Sternotomy wires and calcified aortic

atherosclerosis are present.



IMPRESSION:

1. Moderate-to-marked cardiomegaly with mild vascular congestion.

2. No evidence of failure or focal infiltrate.

3. Probable background COPD changes.



Dictated by: 



  Dictated on workstation # [id number]

## 2021-09-22 NOTE — ED CARDIAC GENERAL
History of Present Illness


General


Chief Complaint:  Cardiac/General Problems


Stated Complaint:  AFIB


Nursing Triage Note:  


PT AMB TO [facility] WITH COMPLAINT OF AFIB, SOA, AND NOT FEELING WELL. STATES 


SYMPTOMS STARTED A FEW DAYS AGO AND WORSENED TODAY. STATES TAKES ELIQUIS TWICE A




DAY.


Source:  patient





History of Present Illness


Date Seen by Provider:  Sep 22, 2021


Time Seen by Provider:  12:15


Initial Comments


PT ARRIVES VIA POV FROM HOME


PT STATES SHE HAS ATRIAL FIBRILLATION, AND HAS BEEN FEELING BAD FOR THE LAST FEW

DAYS


C/O PALPITATIONS


C/O CHEST HEAVINESS


C/O FATIGUE


C/O SLIGHT SHORTNESS OF BREATH


C/O SLIGHT NAUSEA


NO SWEATS


NO DIZZINESS OR SYNCOPE


NO SWELLING IN LEGS/FEET





PT IS ON ELIQUIS AND ASPIRIN, METOPROLOL, MINOXIDIL


DENIES ANY MISSED DOSES OF MEDICATIONS, AND HAS TAKEN MORNING MEDICATIONS


NO RECENT MEDICATION CHANGES





PT ALSO HAS HX OF CAD WITH CABG.





PCP: DR. DE LOS SANTOS


CARDIOLOGIST: DR. YOO--ROUTINE APPOINTMENT TOMORROW





Allergies and Home Medications


Allergies


Coded Allergies:  


     Ceci Known Allergies (Verified  Allergy, Unknown, 4/24/07)





Patient Home Medication List


Amlodipine Besylate (Amlodipine Besylate) 5 Mg Tablet, 5 MG PO 1400, (Reported)


   Entered as Reported by: ELIZABETH MCNALLY on 4/17/18 0940


Apixaban (Eliquis) 5 Mg Tablet, 5 MG PO BID


   Prescribed by: OMER BOJORQUEZ on 3/18/21 0950


Aspirin (Aspirin EC) 325 Mg Tablet.dr, 325 MG PO DAILY, (Reported)


   Entered as Reported by: IGNACIO CARPENTER on 3/15/21 1137


Atorvastatin Calcium (Atorvastatin Calcium) 20 Mg Tablet, 20 MG PO 1400, 

(Reported)


   Entered as Reported by: IGNACIO CARPENTER on 3/15/21 1137


Cholecalciferol (Vitamin D3) (Vitamin D3) 25 Mcg Capsule, 25 MCG PO DAILY, 

(Reported)


   Entered as Reported by: IGNACIO CARPENTER on 3/15/21 1137


Lisinopril (Lisinopril) 20 Mg Tablet, 20 MG PO 1400, (Reported)


   Entered as Reported by: IRWIN RUTHERFORD on 2/1/16 2216


Metoprolol Succinate (Metoprolol Succinate) 100 Mg Tab.er.24h, 100 MG PO BID


   Prescribed by: OMER BOJORQUEZ on 3/17/21 1404


Minoxidil (Minoxidil) 10 Mg Tab, 10 MG PO BID, (Reported)


   Entered as Reported by: ELIZABETH MCNALLY on 4/17/18 0940


Gib[place]-3S/Dha/Epa/Fish Oil (Fish Oil 1,200 mg Softgel) 1 Each Capsule, 1 EACH PO 

DAILY, (Reported)


   Entered as Reported by: IGNACIO CARPENTER on 3/15/21 1137





Review of Systems


Review of Systems


Constitutional:  no symptoms reported; No diaphoresis, No dizziness


EENTM:  No Symptoms Reported


Respiratory:  See HPI, Shortness of Air


Cardiovascular:  See HPI, Chest Pain; Denies Edema; Irregular Heart Rate; Denies

Lightheadedness; Palpitations; Denies Syncope


Gastrointestinal:  See HPI; Denies Abdominal Pain; Nausea; Denies Vomiting


Genitourinary:  No Symptoms Reported


Musculoskeletal:  no symptoms reported


Skin:  no symptoms reported


Psychiatric/Neurological:  No Symptoms Reported


Endocrine:  No Symptoms Reported


Hematologic/Lymphatic:  No Symptoms Reported





Past Medical-Social-Family Hx


Patient Social History


Tobacco Use?:  No


Smoking Status:  Former Smoker


Use of E-Cig and/or Vaping dev:  No


Substance use?:  No


Alcohol Use?:  No


Pt feels they are or have been:  No





Immunizations Up To Date


Tetanus Booster (TDap):  More than 5yrs


PED Vaccines UTD:  No





Seasonal Allergies


Seasonal Allergies:  No





Past Medical History


Surgeries:  Yes (AAA REPAIR; LEFT LEG ANURYSM REPAIR; CARDIAC STENTS X 5;CABG)


Abdominal, Cardiac, CABG, Coronary Stent, Tonsillectomy, Vascular Surgery


Respiratory:  Yes


Sleep Apnea


Currently Using CPAP:  No


Currently Using BIPAP:  No


Cardiac:  Yes


Atrial Fibrillation, Coronary Artery Disease, Heart Attack, High Cholesterol, 

Hypertension, Peripheral Vascular


Neurological:  No


Reproductive Disorders:  No


GYN History:  Menopausal


Gastrointestinal:  No


Musculoskeletal:  No


Endocrine:  No


Cancer:  No


Psychosocial:  Yes


Anxiety


Integumentary:  No


Blood Disorders:  No


Adverse Reaction/Blood Tranf:  No





Family Medical History





FHx: stroke


  19 FATHER


  17 MOTHER





Physical Exam


Vital Signs





Vital Signs - First Documented








 9/22/21





 12:17


 


Pulse 168


 


Resp 20


 


B/P (MAP) 161/113 (129)


 


Pulse Ox 95


 


O2 Delivery Room Air





Capillary Refill : Less Than 3 Seconds


Height, Weight, BMI


Height: 5'0.00"


Weight: 140lbs. 0.0oz. 63.232797mx; 25.00 BMI


Method:Stated


General Appearance:  No Apparent Distress, WD/WN, Other (WALKS IN ON HER OWN 

WITHOUT DIFFICULTY; SMILING, PLEASANT, TALKATIVE. )


Neck:  Normal Inspection


Respiratory:  Normal Breath Sounds, No Accessory Muscle Use, No Respiratory 

Distress


Cardiovascular:  No JVD, No Murmur, Normal Peripheral Pulses, Irregularly 

Irregular, Tachycardia


Gastrointestinal:  Non Tender, Soft


Extremity:  Normal Capillary Refill, Non Tender, No Calf Tenderness, Pedal Edema

(1+ BILATERALLY)


Neurologic/Psychiatric:  Alert, Oriented x3, No Motor/Sensory Deficits, Normal 

Mood/Affect, CNs II-XII Norm as Tested


Skin:  Normal Color, Warm/Dry





Progress/Results/Core Measures


Results/Orders


Lab Results





Laboratory Tests








Test


 9/22/21


12:27 Range/Units


 


 


White Blood Count


 10.9 


 4.3-11.0


10^3/uL


 


Red Blood Count


 4.77 


 3.80-5.11


10^6/uL


 


Hemoglobin 13.4  11.5-16.0  g/dL


 


Hematocrit 43  35-52  %


 


Mean Corpuscular Volume 89  80-99  fL


 


Mean Corpuscular Hemoglobin 28  25-34  pg


 


Mean Corpuscular Hemoglobin


Concent 32 


 32-36  g/dL





 


Red Cell Distribution Width 15.2 H 10.0-14.5  %


 


Platelet Count


 188 


 130-400


10^3/uL


 


Mean Platelet Volume 11.6  9.0-12.2  fL


 


Immature Granulocyte % (Auto) 0   %


 


Neutrophils (%) (Auto) 76 H 42-75  %


 


Lymphocytes (%) (Auto) 15  12-44  %


 


Monocytes (%) (Auto) 7  0-12  %


 


Eosinophils (%) (Auto) 1  0-10  %


 


Basophils (%) (Auto) 1  0-10  %


 


Neutrophils # (Auto)


 8.3 H


 1.8-7.8


10^3/uL


 


Lymphocytes # (Auto)


 1.6 


 1.0-4.0


10^3/uL


 


Monocytes # (Auto)


 0.8 


 0.0-1.0


10^3/uL


 


Eosinophils # (Auto)


 0.1 


 0.0-0.3


10^3/uL


 


Basophils # (Auto)


 0.1 


 0.0-0.1


10^3/uL


 


Immature Granulocyte # (Auto)


 0.0 


 0.0-0.1


10^3/uL


 


Prothrombin Time 17.5 H 12.2-14.7  SEC


 


INR Comment 1.4  0.8-1.4  


 


Activated Partial


Thromboplast Time 29 


 24-35  SEC











My Orders





Orders - GABRIELE FELIPE DO


Ed Iv/Invasive Line Start (9/22/21 12:22)


Ekg Tracing (9/22/21 12:22)


O2 (9/22/21 12:22)


Monitor-Rhythm Ecg Trace Only (9/22/21 12:22)


Chest 1 View, Ap/Pa Only (9/22/21 12:22)


BNP (9/22/21 12:22)


Cbc With Automated Diff (9/22/21 12:22)


Comprehensive Metabolic Panel (9/22/21 12:22)


Creatine Kinase (9/22/21 12:22)


Creatine Kinase Mb (9/22/21 12:22)


Magnesium (9/22/21 12:22)


Protime With Inr (9/22/21 12:22)


Partial Thromboplastin Time (9/22/21 12:22)


Thyroid Analyzer (9/22/21 12:22)


Troponin I (9/22/21 12:22)


Diltiazem Injection (Cardizem Injection) (9/22/21 12:30)


Diltiazem Drip Pre-Mix (Cardizem Drip Pr (9/22/21 12:30)


Diltiazem Injection (Cardizem Injection) (9/22/21 12:30)


Ekg Tracing (9/22/21 12:47)


Covid 19 Inhouse Test (9/22/21 12:55)





Medications Given in ED





Current Medications








 Medications  Dose


 Ordered  Sig/Lloyd


 Route  Start Time


 Stop Time Status Last Admin


Dose Admin


 


 Diltiazem HCl  20 mg  ONCE  ONCE


 IVP  9/22/21 12:30


 9/22/21 12:31 DC 9/22/21 12:40


20 MG








Vital Signs/I&O











 9/22/21





 12:17


 


Pulse 168


 


Resp 20


 


B/P (MAP) 161/113 (129)


 


Pulse Ox 95


 


O2 Delivery Room Air














Blood Pressure Mean:                    129











Progress


Progress Note :  


Progress Note


GIVEN CARDIZEM BOLUS AND PLACED IN DRIP, WITH IMMEDIATE DECREASE IN RATE TO 

80'S, PT REMAINS IN ATRIAL FIBRILLATION





NO DETERIORATION IN PT'S CONDITION DURING ER STAY


Initial ECG Impression Date:  Sep 22, 2021


Initial ECG Impression Time:  12:21


Initial ECG Rate:  160


Initial ECG Rhythm:  A Fib/Flutter (WITH RVR)


Comment


OLD INFERIOR Q WAVES


EKG :  


   EKG Time:  12:44


   Rate:  85


   Rhythm:  A Fib/Flutter


Comment


OLD INFERIOR Q WAVES





Diagnostic Imaging





Comments


CXR--


   Reviewed:  Reviewed by Me





Departure


Departure-Patient Inst.


Referrals:  


TISH DE LOS SANTOS DO (PCP/Family)


Primary Care Physician











GABRIELE FELIPE DO                 Sep 22, 2021 13:05

## 2021-09-22 NOTE — TELE-ICU CONSULT
History of Present Illness


History of Present Illness


Date Seen by Provider:  Sep 22, 2021


Time Seen by Provider:  16:47


Date of Admission


76 F with palpatations was in a fib with V rate 160, given 20 mg IV Cardizem, 

brought rate to 80's,on maintenance rate of 10


pt awake with no SOB, CP





Hx of CABG about 2000, multiple stents


no DM, 


Has HLD, PVD, CAD


AAA repair


SAMEER not on CPAP at home





Allergies and Home Medications


Allergies


Coded Allergies:  


     NKANo Known Allergies (Verified  Allergy, Unknown, 4/24/07)





Home Medications


Amlodipine Besylate 5 Mg Tablet, 5 MG PO 1400, (Reported)


   LAST FILLED 03- #90/90 DAY SUPPLY 


Apixaban 5 Mg Tablet, 5 MG PO BID, (Reported)


Aspirin 81 Mg Tablet.dr, 81 MG PO DAILY, (Reported)


Atorvastatin Calcium 20 Mg Tablet, 20 MG PO 1400, (Reported)


Cholecalciferol (Vitamin D3) 25 Mcg Capsule, 25 MCG PO DAILY, (Reported)


Lisinopril 20 Mg Tablet, 20 MG PO 1400, (Reported)


Metoprolol Succinate 50 Mg Tab.er.24h, 50 MG PO 1400, (Reported)


Minoxidil 10 Mg Tab, 10 MG PO BID, (Reported)


Omega-3S/Dha/Epa/Fish Oil 1 Each Capsule, 1 EACH PO 1400, (Reported)





Past Medical/Social/Family Hx


Patient Social History


Tobacco Use?:  No


Smoking Status:  Former Smoker


Smokeless Tobacco Frequency:  Never a User


Use of E-Cig and/or Vaping dev:  No


Substance use?:  No


Alcohol Use?:  No


Pt stated abuse/neglect:  No





Immunizations Up To Date


Influenza Vaccine Up-to-Date:  No; Not Current


First/Initial COVID19 Vaccinat:  UNVACCINATED


Tetanus Booster (TDap):  Unknown


Hepatitis A:  No


Hepatitis B:  No


TB Skin Test:  Negative


Date of Pneumonia Vaccine:  Jan 25, 2007





Current Status


Pregnancy status:  No


Breastfeeding status:  No


Advance Directives:  No


Communicates:  Verbally


Primary Language:  English


Preferred Spoken Language:  English


Is interpretation needed?:  No


Additional sensory deficits:  N/A


Implanted or Applied Medical D:  Orthopedic hardware, Stents





Review of Systems


Constitutional:  see HPI


EENTM:  see HPI


Respiratory:  see HPI


Cardiovascular:  see HPI


Gastrointestinal:  see HPI


Genitourinary:  see HPI


Musculoskeletal:  see HPI


Skin:  see HPI


Psychiatric/Neurological:  See HPI





Sepsis Event


Evaluation


Height, Weight, BMI


Height: 5'0.00"


Weight: 140lbs. 0.0oz. 63.314015oy; 25.35 BMI


Method:Stated





Exam


Exam


Patient acknowledged, consented, and participated in this virtual visit which 

was conducted using real time audio/video


Vital Signs








  Date Time  Temp Pulse Resp B/P (MAP) Pulse Ox O2 Delivery O2 Flow Rate FiO2


 


9/22/21 15:42     98 Room Air  


 


9/22/21 15:30  104 12 110/89 96 Room Air  


 


9/22/21 15:15  123 31 146/105 95 Room Air  


 


9/22/21 15:10  147      


 


9/22/21 15:00 36.6   139/118  Room Air  


 


9/22/21 14:45  119 18 149/106 97 Room Air  


 


9/22/21 12:17  168 20 161/113 (129) 95 Room Air  








Height & Weight


Height: 5'0.00"


Weight: 140lbs. 0.0oz. 63.708744zz; 25.35 BMI


Method:Stated


General Appearance:  No Apparent Distress, WD/WN, Other (WALKS IN ON HER OWN 

WITHOUT DIFFICULTY; SMILING, PLEASANT, TALKATIVE. )


Neck:  Normal Inspection


Respiratory:  Normal Breath Sounds, No Accessory Muscle Use, No Respiratory 

Distress


Cardiovascular:  No JVD, No Murmur, Normal Peripheral Pulses, Irregularly 

Irregular, Tachycardia


Capillary Refill:  Less Than 3 Seconds


Gastrointestinal:  normal bowel sounds, non tender


Extremity:  Normal Capillary Refill, Non Tender, No Calf Tenderness, Pedal Edema

(1+ BILATERALLY, Right > Left, chronic)


Neurologic/Psychiatric:  Alert, Oriented x3, No Motor/Sensory Deficits, Normal 

Mood/Affect, CNs II-XII Norm as Tested


Skin:  Normal Color, Warm/Dry





Results


Lab


Laboratory Tests


9/22/21 12:27











Assessment/Plan


Assessment/Plan


A fib, controlled on IV Cardizem, may go to CCL tomorrow for cardioversion, 


Has multiple vascular problems.


Critical Care:  Critically Ill Patient


Time spent with patient (mins):  DAISY KRAUSE MD             Sep 22, 2021 16:52

## 2021-09-22 NOTE — CONSULTATION-CARDIOLOGY
HPI-Cardiology


Cardiology Consultation:


Date of Consultation


9/22/21


Time Seen by a Provider:  16:15


Date of Admission





Attending Physician


Fatimah Engle MD


Admitting Physician


Adams Araiza DO


Consulting Physician


DAVID YOO MD, MA, FACP, FACC, FSCAI, CCDS





HPI:


Chief Complaint:


Reason for Card Consult: A-fb with RVR





HPI


Ms. Reaves is a 76 yr old female admitted to ICU 11 from the ED with a-fib with 

RVR. She reports she has had an occ feeling of palpitations, but typically they 

last only a few minutes.  Today she reports she went to eat with her sister and 

when leaving the restaurant she began to feel her heart beat fast and hard.  She

reports she was feeling weak, lightheaded and SOB.  No report of syncope or near

syncope.  No c/o CP.  She reports chronic mild to mod bilat ankle swelling, R>L,

which is unchanged.  She denies missing any medication doses including OAC.  She

does not report any n/v/d.  No c/o fever or chills.  She reports she is feeling 

better at this time.





Review of Systems-Cardiology


Review of Systems


Constitutional:  No chills, No fever; lightheadedness


Eyes:  No vision change


Ears/Nose/Throat:  No epistaxis, No recent hearing loss


Respiratory:  As described under HPI


Cardiovascular:  As described under HPI


Gastrointestinal:  No constipation, No diarrhea, No nausea, No vomiting


Genitourinary:  No dysuria, No hematuria


Musculoskeletal:  no symptoms reported


Skin:  No rash on exposed areas, No ulcerations on exposed areas


Psychiatric/Neurological:  anxiety; No depression, No seizure, No focal 

weakness, No syncope


Hematologic:  No bleeding abnormalities





PMH-Social-Family Hx


Patient Social History


Smoking Status:  Former Smoker


2nd Hand Smoke Exposure:  No


Have you traveled recently?:  No


Alcohol Use?:  No


Pt feels they are or have been:  No





Immunizations Up To Date


Tetanus Booster (TDap):  More than 5yrs


Date of Pneumonia Vaccine:  Jan 25, 2007





Past Medical History


PMH


As described under Assessment.





Family Medical History


Family Medical History:  


She reports her father and mother both had strokes.  She does not report


any premature CAD or SCD.


Family History:  


FHx: stroke


  19 FATHER


  19 MOTHER





Allergies and Home Medications


Allergies


Coded Allergies:  


     NKANo Known Allergies (Verified  Allergy, Unknown, 4/24/07)





Patient Home Medication List


Home Medication List Reviewed:  Yes


Amlodipine Besylate (Amlodipine Besylate) 5 Mg Tablet, 5 MG PO 1400, (Reported)


   Entered as Reported by: ELIZABETH MCNALLY on 4/17/18 0940


   Last Action: Reviewed


Apixaban (Eliquis) 5 Mg Tablet, 5 MG PO BID, (Reported)


   Entered as Reported by: IGNACIO CARPENTER on 9/22/21 1621


   Last Action: Reviewed


Aspirin (Aspirin EC) 81 Mg Tablet., 81 MG PO DAILY, (Reported)


   Entered as Reported by: IGNACIO CARPENTER on 9/22/21 1559


   Last Action: Reviewed


Atorvastatin Calcium (Atorvastatin Calcium) 20 Mg Tablet, 20 MG PO 1400, 

(Reported)


   Entered as Reported by: IGNACIO CARPENTER on 3/15/21 1137


   Last Action: Reviewed


Cholecalciferol (Vitamin D3) (Vitamin D3) 25 Mcg Capsule, 25 MCG PO DAILY, 

(Reported)


   Entered as Reported by: IGNACIO CARPENTER on 3/15/21 1137


   Last Action: Reviewed


Lisinopril (Lisinopril) 20 Mg Tablet, 20 MG PO 1400, (Reported)


   Entered as Reported by: IRWIN RUTHERFORD on 2/1/16 2216


   Last Action: Reviewed


Metoprolol Succinate (Metoprolol Succinate) 50 Mg Tab.er.24h, 50 MG PO 1400, 

(Reported)


   Entered as Reported by: IGNACIO CARPENTER on 9/22/21 1559


   Last Action: Reviewed


Minoxidil (Minoxidil) 10 Mg Tab, 10 MG PO BID, (Reported)


   Entered as Reported by: ELIZABETH MCNALLY on 4/17/18 0940


   Last Action: Reviewed


Omega-3S/Dha/Epa/Fish Oil (Fish Oil 1,200 mg Softgel) 1 Each Capsule, 1 EACH PO 

1400, (Reported)


   Entered as Reported by: IGNACIO CARPENTER on 3/15/21 1137


   Last Action: Reviewed


Discontinued Medications


Apixaban (Eliquis) 5 Mg Tablet, 5 MG PO BID


   Discontinued Reason: No Longer Taking


   Prescribed by: OMER BOJORQUEZ on 3/18/21 0950


   Last Action: Discontinued


Aspirin (Aspirin EC) 325 Mg Tablet., 325 MG PO DAILY, (Reported)


   Discontinued Reason: No Longer Taking


   Entered as Reported by: IGNACIO CARPENTER on 3/15/21 1137


   Last Action: Discontinued


Metoprolol Succinate (Metoprolol Succinate) 100 Mg Tab.er.24h, 100 MG PO BID


   Discontinued Reason: No Longer Taking


   Prescribed by: OMER BOJORQUEZ on 3/17/21 1404


   Last Action: Discontinued





Physical Exam-Cardiology


Physical Exam


Vital Signs/I&O











 9/22/21 9/22/21 9/22/21 9/22/21





 12:17 14:45 15:00 15:15


 


Temp   36.6 


 


Pulse 168 119  123


 


Resp 20 18  31


 


B/P (MAP) 161/113 (129) 149/106 139/118 146/105


 


Pulse Ox 95 97  95


 


O2 Delivery Room Air Room Air Room Air Room Air


 


    





 9/22/21 9/22/21  





 15:30 15:42  


 


Pulse 104   


 


Resp 12   


 


B/P (MAP) 110/89   


 


Pulse Ox 96 98  


 


O2 Delivery Room Air Room Air  





Capillary Refill : Less Than 3 Seconds


Constitutional:  AAO x 3, well-developed, well-nourished


HEENT:  PERRL, hearing is well preserved, oral hygience is good


Neck:  No carotid bruit; carotid pulses are 2 + bilaterally


Respiratory:  No accessory muscle use, No respiratory distress; chest expansion 

is symmetric, chest is bilaterally symmetric, lungs clear to auscultation


Cardiovascular:  irregularly irregular; No JVD; tachycardia


Gastrointestinal:  No tender; soft, round, audible bowel sounds


Extremities:  other (mild right ankle swelling)


Neurologic/Psychiatric:  grossly intact (moves all extremities)


Skin:  No rash on exposed areas, No ulcerations on exposed areas





Data Review


Labs


Laboratory Tests


9/22/21 12:27: 


White Blood Count 10.9, Red Blood Count 4.77, Hemoglobin 13.4, Hematocrit 43, 

Mean Corpuscular Volume 89, Mean Corpuscular Hemoglobin 28, Mean Corpuscular 

Hemoglobin Concent 32, Red Cell Distribution Width 15.2H, Platelet Count 188, 

Mean Platelet Volume 11.6, Immature Granulocyte % (Auto) 0, Neutrophils (%) 

(Auto) 76H, Lymphocytes (%) (Auto) 15, Monocytes (%) (Auto) 7, Eosinophils (%) 

(Auto) 1, Basophils (%) (Auto) 1, Neutrophils # (Auto) 8.3H, Lymphocytes # 

(Auto) 1.6, Monocytes # (Auto) 0.8, Eosinophils # (Auto) 0.1, Basophils # (Auto)

0.1, Immature Granulocyte # (Auto) 0.0, Prothrombin Time 17.5H, INR Comment 1.4,

Activated Partial Thromboplast Time 29, Sodium Level 140, Potassium Level 4.4, 

Chloride Level 107, Carbon Dioxide Level 24, Anion Gap 9, Blood Urea Nitrogen 

23H, Creatinine 0.84, Estimat Glomerular Filtration Rate 66, BUN/Creatinine 

Ratio 27, Glucose Level 127H, Calcium Level 9.1, Corrected Calcium 9.1, 

Magnesium Level 2.2, Total Bilirubin 0.6, Aspartate Amino Transf (AST/SGOT) 30, 

Alanine Aminotransferase (ALT/SGPT) 39, Alkaline Phosphatase 75, Total Creatine 

Kinase 63, Creatine Kinase MB 1.3, Troponin I 0.032H, B-Type Natriuretic Peptide

921.8H, Total Protein 7.3, Albumin 4.0, TSH Cascade Testing 1.75


9/22/21 13:32: SARS-CoV-2 RNA (RT-PCR) Not Detected








A/P-Cardiology


Assessment/Admission Diagnosis





Paroxysmal atrial fibrillation - currently a-fib with RVR


- first diagnosed March 2021 hospital admission


- OAC with Eliquis





Minimally elevated troponin (Type 2 MI)


- likely secondary to a-fib with RVR





Coronary artery disease


- history of CABG done in 2001


- cardiac catheterization in February 2016: LAD occluded at the midportion, the 

distal LAD protected by patent LIMA; high diagonal branch stent stent known to 

be Promus 2.2 x 12 mm placed in 2012; the circumflex had a patent stent in the 

proximal portion; RCA was occluded proximally protected by patent vein graft to 

the distal right coronary artery.


- Patient had an abnormal stress test in April 2018, attempt for cardiac 

catheterization failed due to the extensive disease in her thoracic and 

abdominal aorta. Medical therapy recommended


- Echocardiogram of 3-15-21 by Dr. Cabrera showed LVEF 55-65%.  LA mod dilated.  

RA dilated.  PASP 30-35 mmHg





History of thoracic and abdominal aneurysm


- status post repair done by Dr. Alcaraz in [place] in 2013.





Peripheral arterial disease 


- left lower extremity aneurysm, following [facility] (Dr Alcaraz).





Hypertension





Hyperlipidemia





History of carotid stenosis


-  followed by Dr. Alcaraz at [facility] 





History of intermittent hypokalemia





Discussion and Recomendations





Continue cardizem gtt for rate control


Resume OAC with Eliquis


Resume ASA d/t known h/o CAD


BB for rate control - adjust as indicated


Consider elec CV tomorrow if vent rate remains uncontrolled


Monitor lab closely


Replace electrolytes as indicated


Further recs will be based on her hospital course


We would like to thank Medical services for this consult











DAVID YOO MD FACP FAC CCDS   Sep 22, 2021 16:41

## 2021-09-23 LAB
ALBUMIN SERPL-MCNC: 3.7 GM/DL (ref 3.2–4.5)
ALP SERPL-CCNC: 71 U/L (ref 40–136)
ALT SERPL-CCNC: 34 U/L (ref 0–55)
BASOPHILS # BLD AUTO: 0 10^3/UL (ref 0–0.1)
BASOPHILS NFR BLD AUTO: 1 % (ref 0–10)
BILIRUB SERPL-MCNC: 1 MG/DL (ref 0.1–1)
BUN/CREAT SERPL: 20
CALCIUM SERPL-MCNC: 9 MG/DL (ref 8.5–10.1)
CHLORIDE SERPL-SCNC: 103 MMOL/L (ref 98–107)
CO2 SERPL-SCNC: 27 MMOL/L (ref 21–32)
CREAT SERPL-MCNC: 0.82 MG/DL (ref 0.6–1.3)
EOSINOPHIL # BLD AUTO: 0.1 10^3/UL (ref 0–0.3)
EOSINOPHIL NFR BLD AUTO: 2 % (ref 0–10)
GFR SERPLBLD BASED ON 1.73 SQ M-ARVRAT: 67 ML/MIN
GLUCOSE SERPL-MCNC: 120 MG/DL (ref 70–105)
HCT VFR BLD CALC: 39 % (ref 35–52)
HGB BLD-MCNC: 12.4 G/DL (ref 11.5–16)
LYMPHOCYTES # BLD AUTO: 1.3 10^3/UL (ref 1–4)
LYMPHOCYTES NFR BLD AUTO: 17 % (ref 12–44)
MAGNESIUM SERPL-MCNC: 2.1 MG/DL (ref 1.6–2.4)
MANUAL DIFFERENTIAL PERFORMED BLD QL: NO
MCH RBC QN AUTO: 27 PG (ref 25–34)
MCHC RBC AUTO-ENTMCNC: 32 G/DL (ref 32–36)
MCV RBC AUTO: 86 FL (ref 80–99)
MONOCYTES # BLD AUTO: 0.6 10^3/UL (ref 0–1)
MONOCYTES NFR BLD AUTO: 8 % (ref 0–12)
NEUTROPHILS # BLD AUTO: 5.5 10^3/UL (ref 1.8–7.8)
NEUTROPHILS NFR BLD AUTO: 73 % (ref 42–75)
PHOSPHATE SERPL-MCNC: 3.7 MG/DL (ref 2.3–4.7)
PLATELET # BLD: 181 10^3/UL (ref 130–400)
PMV BLD AUTO: 11.9 FL (ref 9–12.2)
POTASSIUM SERPL-SCNC: 4.1 MMOL/L (ref 3.6–5)
PROT SERPL-MCNC: 6.2 GM/DL (ref 6.4–8.2)
SODIUM SERPL-SCNC: 140 MMOL/L (ref 135–145)
WBC # BLD AUTO: 7.6 10^3/UL (ref 4.3–11)

## 2021-09-23 PROCEDURE — 5A2204Z RESTORATION OF CARDIAC RHYTHM, SINGLE: ICD-10-PCS | Performed by: INTERNAL MEDICINE

## 2021-09-23 RX ADMIN — Medication SCH MCG: at 07:56

## 2021-09-23 RX ADMIN — LISINOPRIL SCH MG: 20 TABLET ORAL at 13:06

## 2021-09-23 RX ADMIN — APIXABAN SCH MG: 5 TABLET, FILM COATED ORAL at 20:34

## 2021-09-23 RX ADMIN — METOPROLOL SUCCINATE SCH MG: 50 TABLET, EXTENDED RELEASE ORAL at 20:34

## 2021-09-23 RX ADMIN — ASPIRIN 81 MG CHEWABLE TABLET SCH MG: 81 TABLET CHEWABLE at 07:56

## 2021-09-23 RX ADMIN — APIXABAN SCH MG: 5 TABLET, FILM COATED ORAL at 07:56

## 2021-09-23 NOTE — PROGRESS NOTE - CARDIOLOGY
Cardiology SOAP Progress Note


Subjective:


Lying in bed


Family x 2 at the bedside


No c/o CP or SOB


Remains in a-fib





Objective:


I&O/Vital Signs











 9/23/21 9/23/21 9/23/21 9/23/21





 01:00 01:00 02:00 03:00


 


Pulse 70 80 81 88


 


Resp  16 22 17


 


B/P (MAP)  134/82 129/84 135/86


 


Pulse Ox  91 95 92


 


O2 Delivery  Room Air Room Air Room Air





 9/23/21 9/23/21 9/23/21 9/23/21





 03:50 03:55 04:00 05:00


 


Temp 36.5   


 


Pulse   84 86


 


Resp   18 16


 


B/P (MAP)   125/88 126/91


 


Pulse Ox  95 94 92


 


O2 Delivery Room Air Room Air Room Air Room Air


 


    





 9/23/21 9/23/21 9/23/21 9/23/21





 06:00 06:40 07:00 07:58


 


Temp    36.8


 


Pulse 96 96 93 


 


Resp 16  14 


 


B/P (MAP) 150/95  149/118 


 


Pulse Ox 92  93 


 


O2 Delivery Room Air  Room Air 


 


    





 9/23/21 9/23/21 9/23/21 9/23/21





 08:00 08:46 09:00 10:00


 


Pulse 130  105 66


 


Resp 22  23 16


 


B/P (MAP) 155/109  136/99 164/91


 


Pulse Ox 96 93 94 92


 


O2 Delivery Room Air Room Air Room Air Room Air





 9/23/21 9/23/21 9/23/21 





 11:00 12:00 12:00 


 


Temp   36.5 


 


Pulse 66   


 


Resp 16   


 


B/P (MAP) 159/97   


 


Pulse Ox 95 93  


 


O2 Delivery Room Air Room Air  














 9/22/21





 23:59


 


Intake Total 500 ml


 


Output Total 1475 ml


 


Balance -975 ml








Weight (Pounds):  140


Weight (Ounces):  0.0


Weight (Calculated Kilograms):  63.663351


Constitutional:  AAO x 3, well-developed, well-nourished


Respiratory:  No accessory muscle use, No respiratory distress; chest expansion 

is symmetric, chest is bilaterally symmetric, lungs clear to auscultation


Cardiovascular:  irregularly irregular; No JVD; tachycardia


Gastrointestional:  No tender; soft, round, audible bowel sounds


Extremities:  other (mild right ankle swelling)


Neurologic/Psychiatric:  grossly intact (moves all extremities)


Skin:  No rash on exposed areas, No ulcerations on exposed areas





Results/Procedures:


Labs


Laboratory Tests


9/22/21 12:27: 


White Blood Count 10.9, Red Blood Count 4.77, Hemoglobin 13.4, Hematocrit 43, 

Mean Corpuscular Volume 89, Mean Corpuscular Hemoglobin 28, Mean Corpuscular 

Hemoglobin Concent 32, Red Cell Distribution Width 15.2H, Platelet Count 188, 

Mean Platelet Volume 11.6, Immature Granulocyte % (Auto) 0, Neutrophils (%) 

(Auto) 76H, Lymphocytes (%) (Auto) 15, Monocytes (%) (Auto) 7, Eosinophils (%) 

(Auto) 1, Basophils (%) (Auto) 1, Neutrophils # (Auto) 8.3H, Lymphocytes # 

(Auto) 1.6, Monocytes # (Auto) 0.8, Eosinophils # (Auto) 0.1, Basophils # (Auto)

0.1, Immature Granulocyte # (Auto) 0.0, Prothrombin Time 17.5H, INR Comment 1.4,

Activated Partial Thromboplast Time 29, Sodium Level 140, Potassium Level 4.4, 

Chloride Level 107, Carbon Dioxide Level 24, Anion Gap 9, Blood Urea Nitrogen 

23H, Creatinine 0.84, Estimat Glomerular Filtration Rate 66, BUN/Creatinine 

Ratio 27, Glucose Level 127H, Calcium Level 9.1, Corrected Calcium 9.1, 

Magnesium Level 2.2, Total Bilirubin 0.6, Aspartate Amino Transf (AST/SGOT) 30, 

Alanine Aminotransferase (ALT/SGPT) 39, Alkaline Phosphatase 75, Total Creatine 

Kinase 63, Creatine Kinase MB 1.3, Troponin I 0.032H, B-Type Natriuretic Peptide

921.8H, Total Protein 7.3, Albumin 4.0, TSH Cascade Testing 1.75


9/22/21 13:32: SARS-CoV-2 RNA (RT-PCR) Not Detected


9/23/21 04:50: 


White Blood Count 7.6, Red Blood Count 4.57, Hemoglobin 12.4, Hematocrit 39, 

Mean Corpuscular Volume 86, Mean Corpuscular Hemoglobin 27, Mean Corpuscular Hem

oglobin Concent 32, Red Cell Distribution Width 15.1H, Platelet Count 181, Mean 

Platelet Volume 11.9, Immature Granulocyte % (Auto) 0, Neutrophils (%) (Auto) 

73, Lymphocytes (%) (Auto) 17, Monocytes (%) (Auto) 8, Eosinophils (%) (Auto) 2,

Basophils (%) (Auto) 1, Neutrophils # (Auto) 5.5, Lymphocytes # (Auto) 1.3, 

Monocytes # (Auto) 0.6, Eosinophils # (Auto) 0.1, Basophils # (Auto) 0.0, 

Immature Granulocyte # (Auto) 0.0, Sodium Level 140, Potassium Level 4.1, 

Chloride Level 103, Carbon Dioxide Level 27, Anion Gap 10, Blood Urea Nitrogen 

16, Creatinine 0.82, Estimat Glomerular Filtration Rate 67, BUN/Creatinine Ratio

20, Glucose Level 120H, Calcium Level 9.0, Corrected Calcium 9.2, Magnesium 

Level 2.1, Total Bilirubin 1.0, Aspartate Amino Transf (AST/SGOT) 20, Alanine 

Aminotransferase (ALT/SGPT) 34, Alkaline Phosphatase 71, Total Protein 6.2L, 

Albumin 3.7, Phosphorus Level 3.7








A/P:


Assessment:





Paroxysmal atrial fibrillation - currently a-fib with RVR


- first diagnosed March 2021 hospital admission


- TSH WNL on 9-23-21


- OAC with Eliquis





Minimally elevated troponin (Type 2 MI)


- likely secondary to a-fib with RVR





Coronary artery disease


- history of CABG done in 2001


- cardiac catheterization in February 2016: LAD occluded at the midportion, the 

distal LAD protected by patent LIMA; high diagonal branch stent stent known to 

be Promus 2.2 x 12 mm placed in 2012; the circumflex had a patent stent in the 

proximal portion; RCA was occluded proximally protected by patent vein graft to 

the distal right coronary artery.


- Patient had an abnormal stress test in April 2018, attempt for cardiac 

catheterization failed due to the extensive disease in her thoracic and 

abdominal aorta. Medical therapy recommended


- Echocardiogram of 3-15-21 by Dr. Cabrera showed LVEF 55-65%.  LA mod dilated.  

RA dilated.  PASP 30-35 mmHg





History of thoracic and abdominal aneurysm


- status post repair done by Dr. Alcaraz in [place] in 2013.





Peripheral arterial disease 


- left lower extremity aneurysm, following with [facility] (Dr Alcaraz).





Hypertension





Hyperlipidemia





History of carotid stenosis


-  followed by Dr. Alcaraz at [facility] 





History of intermittent hypokalemia


Plan:





Continue cardizem gtt for rate control


Continue OAC with Eliquis


Resume ASA d/t known h/o CAD


BB for rate control - adjust as indicated


Elec CV today - vent rate not well controlled - varies from 90's-140's


Monitor lab closely


Replace electrolytes as indicated











LORENZO OLIVEIRA           Sep 23, 2021 08:32

## 2021-09-23 NOTE — HISTORY & PHYSICAL-HOSPITALIST
History of Present Illness


HPI/Chief Complaint


Pt is a 76yoCF witha PMH of CAD s/p CABG, HTN, and a-fib who presented to the ER

due to her heart racing. She has just eating at [employer] and felt like a 

brick hit her chest and it was pounding and racing. She decided to seek 

evaluation in the ER and was found to be in a fib with rvr with a rate in shania 

160s. She was started on a cardizem gtt and admitted to the ICU. This morning 

she reports feeling much better. Her heart rate was down overnight to the 80s 

but is back up to the 120s currently. She denies any symptoms though and is 

requesting coffee.


Source:  patient


Date Seen


21


Time Seen by a Provider:  07:30


Attending Physician


Phil Luna MD


PCP


Adams Araiza DO


Referring Physician





Date of Admission


Sep 22, 2021 at 12:55





Home Medications & Allergies


Home Medications


Reviewed patient Home Medication Reconciliation performed by pharmacy medication

reconciliations technician and/or nursing.


Patients Allergies have been reviewed.





Allergies





Allergies


Coded Allergies


  NKANo Known Allergies (Verified Allergy, Unknown, 07)








Past Medical-Social-Family Hx


Patient Social History


Employed/Student:  retired


Tobacco Use?:  No


Smoking Status:  Former Smoker


Smokeless Tobacco Frequency:  Never a User


Use of E-Cig and/or Vaping dev:  No


Substance use?:  No


Alcohol Use?:  No


Pt feels they are or have been:  No





Immunizations Up To Date


First/Initial COVID19 Vaccinat:  UNVACCINATED


Tetanus Booster (TDap):  Unknown


Hepatitis A:  No


Hepatitis B:  No


PED Vaccines UTD:  No


Date of Pneumonia Vaccine:  2007





Seasonal Allergies


Seasonal Allergies:  No





Current Status


Pregnancy status:  No


Breastfeeding status:  No


Advance Directives:  No


Communicates:  Verbally


Primary Language:  English


Preferred Spoken Language:  English


Is interpretation needed?:  No


Additional sensory deficits:  N/A


Implanted or Applied Medical D:  Orthopedic hardware, Stents





Past Medical History


Surgeries:  Abdominal, Cardiac, CABG, Coronary Stent, Tonsillectomy, Vascular [facility]

rgery


Sleep Apnea


Currently Using CPAP:  No


Currently Using BIPAP:  No


Atrial Fibrillation, Coronary Artery Disease, Heart Attack, High Cholesterol, 

Hypertension, Peripheral Vascular


GYN History:  Menopausal


Anxiety


Blood Disorders:  No


Adverse Reaction/Blood Tranf:  No





Family Medical History


Reviewed Nursing Family Hx





FHx: stroke


  19 FATHER


  19 MOTHER


No Pertinent Family Hx





Review of Systems


Constitutional:  No fever


EENTM:  no symptoms reported


Respiratory:  No cough, No short of breath


Cardiovascular:  see HPI


Gastrointestinal:  no symptoms reported


Musculoskeletal:  no symptoms reported


Psychiatric/Neurological:  No Symptoms Reported


All Other Systems Reviewed


Negative Unless Noted:  Yes





Physical Exam


Physical Exam


Vital Signs





Vital Signs - First Documented








 21





 12:17 15:00


 


Temp  36.6


 


Pulse 168 


 


Resp 20 


 


B/P (MAP) 161/113 (129) 


 


Pulse Ox 95 


 


O2 Delivery Room Air 





Capillary Refill : Less Than 3 Seconds


Height, Weight, BMI


Height: 5'0.00"


Weight: 140lbs. 0.0oz. 63.770998wq; 25.35 BMI


Method:Stated


General Appearance:  No Apparent Distress, WD/WN


HEENT:  PERRL/EOMI, Moist Mucous Membranes


Neck:  Normal Inspection, Supple


Respiratory:  Lungs Clear, No Accessory Muscle Use, No Respiratory Distress


Cardiovascular:  Regular Rate, Rhythm, No Murmur


Gastrointestinal:  Normal Bowel Sounds, Non Tender, Soft


Extremity:  Normal Capillary Refill, No Calf Tenderness, No Pedal Edema


Neurologic/Psychiatric:  Alert, Oriented x3


Skin:  Normal Color, Warm/Dry





Results


Results/Procedures


Labs


Laboratory Tests


21 12:27








21 04:50








Patient resulted labs reviewed.


Imaging:  Reviewed Imaging Report


Imaging


                 [facility] [place], [place]





NAME:   ADAMS WATKINS


MED REC#:   [id number]


ACCOUNT#:   [id number]


PT STATUS:   REG ER


:   1945


PHYSICIAN:   GABRIELE FELIPE DO


ADMIT DATE:   21/ER


                                   ***Draft***


Date of Exam:21





CHEST 1 VIEW, AP/PA ONLY








INDICATION: Atrial fibrillation. Shortness of air.





COMPARISON: 2021.





FINDINGS: Single frontal radiographic view of the chest was


obtained and demonstrates moderate-to-marked cardiomegaly.


Pulmonary vasculature is slightly prominent as well. Lungs,


however, are clear. Note is made of hyperinflation with


flattening of the hemidiaphragms. There is no large effusion or


pneumothorax. Osseous structures show no gross acute


abnormalities. Sternotomy wires and calcified aortic


atherosclerosis are present.





IMPRESSION:


1. Moderate-to-marked cardiomegaly with mild vascular congestion.


2. No evidence of failure or focal infiltrate.


3. Probable background COPD changes.





  Dictated on workstation # [id number]








Dict:   21 [id number]


Trans:   21 1308


 [id number]





Interpreted by:     WIL SALCIDO MD


Electronically signed by:





Assessment/Plan


Admission Diagnosis


A-fib with RVR


Admission Status:  Inpatient Order (span 2 midnights)


Reason for Inpatient Admission:  


see below





Assessment and Plan


A-fib with RVR


NSTEMI- Type II MI


   Currently on cardizem gtt


   Eliquis for stroke ppx


   Plan for cardioversion today per cardiology note 


   telemetry


   Cardiology consulted, appreciate recs





HTN


HLD


   continue home meds as able





PAD


thoracic and abd aneurysm


Carotid stenosis


   Follows with Vascular Surgery ( Dr Alcaraz), no acute needs


   Continue meds











PHIL LUNA MD              Sep 23, 2021 09:27

## 2021-09-23 NOTE — DIAGNOSTIC IMAGING REPORT
EXAMINATION: Chest radiograph, portable AP view.



DATE: 9/23/2021 4:40 AM



INDICATION: 76-year-old female, shortness of breath.



COMPARISON:  September 22, 2021.



FINDINGS: There are median sternotomy wires. The aorta is

tortuous. Heart size and mediastinal contours are unchanged.

There is no identified pneumothorax. There is no large pleural

effusion. There is no identified interval focal airspace

consolidation. There are right carotid vascular calcifications.



IMPRESSION: 

1. Redemonstrated cardiomegaly without identified interval acute

cardiopulmonary abnormality.



Dictated by: 



  Dictated on workstation # [id number]

## 2021-09-23 NOTE — ANESTHESIA-GENERAL POST-OP
MAC


Patient Condition


Mental Status/LOC:  Same as Preop


Cardiovascular:  Satisfactory


Nausea/Vomiting:  Absent


Respiratory:  Satisfactory


Pain:  Controlled


Complications:  Absent





Post Op Complications


Complications


None





Follow Up Care/Instructions


Patient Instructions


None needed.





Anesthesiology Discharge Order


Discharge Order


Patient is doing well, no complaints, stable vital signs, no apparent adverse 

anesthesia problems.   


No complications reported per nursing.











REFUGIO CISSE CRNA         Sep 23, 2021 13:21

## 2021-09-23 NOTE — PROGRESS NOTE - CARDIOLOGY
Cardiology SOAP Progress Note


Subjective:


Persistent palp (prior to elec CV this am)


No cp or syncope


No shortness of breath at rest


No n/v/d


Gen malaise and weakness present





Objective:


I&O/Vital Signs











 9/22/21 9/22/21 9/23/21 9/23/21





 22:00 23:00 00:00 00:00


 


Pulse 86 73  80


 


Resp 17 17  14


 


B/P (MAP) 115/57 111/70  135/71


 


Pulse Ox 92 93 94 91


 


O2 Delivery Room Air Room Air Room Air Room Air





 9/23/21 9/23/21 9/23/21 9/23/21





 00:00 01:00 01:00 02:00


 


Temp 36.9   


 


Pulse  70 80 81


 


Resp   16 22


 


B/P (MAP)   134/82 129/84


 


Pulse Ox   91 95


 


O2 Delivery Room Air  Room Air Room Air


 


    





 9/23/21 9/23/21 9/23/21 9/23/21





 03:00 03:50 03:55 04:00


 


Temp  36.5  


 


Pulse 88   84


 


Resp 17   18


 


B/P (MAP) 135/86   125/88


 


Pulse Ox 92  95 94


 


O2 Delivery Room Air Room Air Room Air Room Air


 


    





 9/23/21 9/23/21 9/23/21 9/23/21





 05:00 06:00 07:58 08:46


 


Temp   36.8 


 


Pulse 86 96  


 


Resp 16 16  


 


B/P (MAP) 126/91 150/95  


 


Pulse Ox 92 92  93


 


O2 Delivery Room Air Room Air  Room Air














 9/23/21





 00:00


 


Intake Total 500 ml


 


Output Total 1475 ml


 


Balance -975 ml








Weight (Pounds):  140


Weight (Ounces):  0.0


Weight (Calculated Kilograms):  63.549780


Constitutional:  AAO x 3, well-developed, well-nourished


Respiratory:  No accessory muscle use, No respiratory distress; chest expansion 

is symmetric, chest is bilaterally symmetric, lungs clear to auscultation


Cardiovascular:  irregularly irregular; No JVD; tachycardia


Gastrointestional:  No tender; soft, round, audible bowel sounds


Extremities:  other (mild right ankle swelling)


Neurologic/Psychiatric:  grossly intact (moves all extremities)


Skin:  No rash on exposed areas, No ulcerations on exposed areas





Results/Procedures:


Labs


Laboratory Tests


9/22/21 12:27: 


White Blood Count 10.9, Red Blood Count 4.77, Hemoglobin 13.4, Hematocrit 43, 

Mean Corpuscular Volume 89, Mean Corpuscular Hemoglobin 28, Mean Corpuscular 

Hemoglobin Concent 32, Red Cell Distribution Width 15.2H, Platelet Count 188, 

Mean Platelet Volume 11.6, Immature Granulocyte % (Auto) 0, Neutrophils (%) 

(Auto) 76H, Lymphocytes (%) (Auto) 15, Monocytes (%) (Auto) 7, Eosinophils (%) 

(Auto) 1, Basophils (%) (Auto) 1, Neutrophils # (Auto) 8.3H, Lymphocytes # 

(Auto) 1.6, Monocytes # (Auto) 0.8, Eosinophils # (Auto) 0.1, Basophils # (Auto)

0.1, Immature Granulocyte # (Auto) 0.0, Prothrombin Time 17.5H, INR Comment 1.4,

Activated Partial Thromboplast Time 29, Sodium Level 140, Potassium Level 4.4, 

Chloride Level 107, Carbon Dioxide Level 24, Anion Gap 9, Blood Urea Nitrogen 

23H, Creatinine 0.84, Estimat Glomerular Filtration Rate 66, BUN/Creatinine 

Ratio 27, Glucose Level 127H, Calcium Level 9.1, Corrected Calcium 9.1, 

Magnesium Level 2.2, Total Bilirubin 0.6, Aspartate Amino Transf (AST/SGOT) 30, 

Alanine Aminotransferase (ALT/SGPT) 39, Alkaline Phosphatase 75, Total Creatine 

Kinase 63, Creatine Kinase MB 1.3, Troponin I 0.032H, B-Type Natriuretic Peptide

921.8H, Total Protein 7.3, Albumin 4.0, TSH Cascade Testing 1.75


9/22/21 13:32: SARS-CoV-2 RNA (RT-PCR) Not Detected


9/23/21 04:50: 


White Blood Count 7.6, Red Blood Count 4.57, Hemoglobin 12.4, Hematocrit 39, 

Mean Corpuscular Volume 86, Mean Corpuscular Hemoglobin 27, Mean Corpuscular 

Hemoglobin Concent 32, Red Cell Distribution Width 15.1H, Platelet Count 181, Me

an Platelet Volume 11.9, Immature Granulocyte % (Auto) 0, Neutrophils (%) (Auto)

73, Lymphocytes (%) (Auto) 17, Monocytes (%) (Auto) 8, Eosinophils (%) (Auto) 2,

Basophils (%) (Auto) 1, Neutrophils # (Auto) 5.5, Lymphocytes # (Auto) 1.3, 

Monocytes # (Auto) 0.6, Eosinophils # (Auto) 0.1, Basophils # (Auto) 0.0, 

Immature Granulocyte # (Auto) 0.0, Sodium Level 140, Potassium Level 4.1, 

Chloride Level 103, Carbon Dioxide Level 27, Anion Gap 10, Blood Urea Nitrogen 

16, Creatinine 0.82, Estimat Glomerular Filtration Rate 67, BUN/Creatinine Ratio

20, Glucose Level 120H, Calcium Level 9.0, Corrected Calcium 9.2, Magnesium 

Level 2.1, Total Bilirubin 1.0, Aspartate Amino Transf (AST/SGOT) 20, Alanine 

Aminotransferase (ALT/SGPT) 34, Alkaline Phosphatase 71, Total Protein 6.2L, 

Albumin 3.7, Phosphorus Level 3.7








A/P:


Assessment:





Paroxysmal atrial fibrillation - continuing a-fib with RVR, treated with elec CV

on 9/23/21


- first diagnosed March 2021 hospital admission


- TSH WNL on 9-23-21


- OAC with Eliquis





Minimally elevated troponin (Type 2 MI)


- likely secondary to a-fib with RVR





Coronary artery disease


- history of CABG done in 2001


- cardiac catheterization in February 2016: LAD occluded at the midportion, the 

distal LAD protected by patent LIMA; high diagonal branch stent stent known to 

be Promus 2.2 x 12 mm placed in 2012; the circumflex had a patent stent in the 

proximal portion; RCA was occluded proximally protected by patent vein graft to 

the distal right coronary artery.


- Patient had an abnormal stress test in April 2018, attempt for cardiac 

catheterization failed due to the extensive disease in her thoracic and 

abdominal aorta. Medical therapy recommended


- Echocardiogram of 3-15-21 by Dr. Cabrera showed LVEF 55-65%.  LA mod dilated.  

RA dilated.  PASP 30-35 mmHg





History of thoracic and abdominal aneurysm


- status post repair done by Dr. Alcaraz in [place] in 2013.





Peripheral arterial disease 


- left lower extremity aneurysm, following with [facility] (Dr Alcaraz).





Hypertension





Hyperlipidemia





History of carotid stenosis


-  followed by Dr. Alcaraz at [facility] 





History of intermittent hypokalemia


Plan:





Because of continuing uncontrolled A Fib (despite meds) we proceeded with elec 

CV after having discussed this fully with her and having obtained an informed 

consent


Change Cardizem gtt to oral long-acting Cardizem


Continue OAC with Eliquis


Continue ASA 


Continue BB for rate control - adjust as indicated


Monitor lab closely


Replace electrolytes as indicated











DAVID YOO MD FACP FAC CCDS   Sep 23, 2021 09:44

## 2021-09-23 NOTE — OPERATIVE REPORT
DATE OF SERVICE:  09/23/2021



PREOPERATIVE DIAGNOSIS:

Symptomatic atrial fibrillation with a rapid ventricular response.



POSTOPERATIVE DIAGNOSIS:

Sinus rhythm.



PROCEDURE:

External electrical cardioversion.



The patient is a 76-year-old lady who presented with symptomatic atrial

fibrillation with a rapid ventricular response.  This remained uncontrolled

despite medications and she continued to remain symptomatic.  External

electrical cardioversion was carried out after having obtained an informed

consent.  She has an oral anticoagulation that she has been taking regularly for

several months.  She has not missed any doses.  Today, short-acting anesthesia

was provided by the nurse anesthetist and we delivered a synchronized 120 joule

biphasic shock through external patches, which restored sinus rhythm.  She

tolerated the procedure well.





Job ID: [id number]

DocumentID: [id number]

Dictated Date:  09/23/2021 09:45:50

Transcription Date: 09/23/2021 12:58:40

Dictated By: DAVID YOO MD, MA, FACP, FACC,

## 2021-09-24 VITALS — DIASTOLIC BLOOD PRESSURE: 109 MMHG | SYSTOLIC BLOOD PRESSURE: 180 MMHG

## 2021-09-24 LAB
ALBUMIN SERPL-MCNC: 3.6 GM/DL (ref 3.2–4.5)
ALP SERPL-CCNC: 76 U/L (ref 40–136)
ALT SERPL-CCNC: 27 U/L (ref 0–55)
BASOPHILS # BLD AUTO: 0 10^3/UL (ref 0–0.1)
BASOPHILS NFR BLD AUTO: 0 % (ref 0–10)
BILIRUB SERPL-MCNC: 1 MG/DL (ref 0.1–1)
BUN/CREAT SERPL: 14
CALCIUM SERPL-MCNC: 8.8 MG/DL (ref 8.5–10.1)
CHLORIDE SERPL-SCNC: 104 MMOL/L (ref 98–107)
CO2 SERPL-SCNC: 23 MMOL/L (ref 21–32)
CREAT SERPL-MCNC: 0.8 MG/DL (ref 0.6–1.3)
EOSINOPHIL # BLD AUTO: 0.2 10^3/UL (ref 0–0.3)
EOSINOPHIL NFR BLD AUTO: 2 % (ref 0–10)
GFR SERPLBLD BASED ON 1.73 SQ M-ARVRAT: 69 ML/MIN
GLUCOSE SERPL-MCNC: 112 MG/DL (ref 70–105)
HCT VFR BLD CALC: 39 % (ref 35–52)
HGB BLD-MCNC: 12.5 G/DL (ref 11.5–16)
LYMPHOCYTES # BLD AUTO: 1.2 10^3/UL (ref 1–4)
LYMPHOCYTES NFR BLD AUTO: 13 % (ref 12–44)
MAGNESIUM SERPL-MCNC: 2.1 MG/DL (ref 1.6–2.4)
MANUAL DIFFERENTIAL PERFORMED BLD QL: NO
MCH RBC QN AUTO: 28 PG (ref 25–34)
MCHC RBC AUTO-ENTMCNC: 32 G/DL (ref 32–36)
MCV RBC AUTO: 86 FL (ref 80–99)
MONOCYTES # BLD AUTO: 0.7 10^3/UL (ref 0–1)
MONOCYTES NFR BLD AUTO: 7 % (ref 0–12)
NEUTROPHILS # BLD AUTO: 7.1 10^3/UL (ref 1.8–7.8)
NEUTROPHILS NFR BLD AUTO: 78 % (ref 42–75)
PHOSPHATE SERPL-MCNC: 3.2 MG/DL (ref 2.3–4.7)
PLATELET # BLD: 177 10^3/UL (ref 130–400)
PMV BLD AUTO: 11.7 FL (ref 9–12.2)
POTASSIUM SERPL-SCNC: 4.2 MMOL/L (ref 3.6–5)
PROT SERPL-MCNC: 6.3 GM/DL (ref 6.4–8.2)
SODIUM SERPL-SCNC: 138 MMOL/L (ref 135–145)
WBC # BLD AUTO: 9.1 10^3/UL (ref 4.3–11)

## 2021-09-24 RX ADMIN — LISINOPRIL SCH MG: 20 TABLET ORAL at 08:11

## 2021-09-24 RX ADMIN — Medication SCH MCG: at 08:11

## 2021-09-24 RX ADMIN — METOPROLOL SUCCINATE SCH MG: 50 TABLET, EXTENDED RELEASE ORAL at 08:11

## 2021-09-24 RX ADMIN — ASPIRIN 81 MG CHEWABLE TABLET SCH MG: 81 TABLET CHEWABLE at 08:11

## 2021-09-24 RX ADMIN — APIXABAN SCH MG: 5 TABLET, FILM COATED ORAL at 08:11

## 2021-09-24 NOTE — DISCHARGE INST-SIMPLE/STANDARD
Discharge Inst-Standard


Discharge Medications


New, Converted or Re-Newed RX:  Transmitted to Pharmacy





Patient Instructions/Follow Up


Plan of Care/Instructions/FU:  


Please continue to take your medications as written. Please follow up with


your primary care doctor to follow up this hospital stay.


Activity as Tolerated:  Yes


Discharge Diet:  No Restrictions


Return to The Hospital For:  


Chest pain, shortness of breath, racing heart rate, fever, confusion,


wekaness, if you feel you are getting worse.











PHIL LUNA MD              Sep 24, 2021 08:50

## 2021-09-24 NOTE — DISCHARGE SUMMARY
Diagnosis/Chief Complaint


Date of Admission


Sep 22, 2021 at 12:55


Date of Discharge





Discharge Date:  Sep 24, 2021


Admission Diagnosis


A-fib with RVR


Primary Care


Tish Araiza DO





Discharge Summary


Discharge Physical Exam


Allergies:  


Coded Allergies:  


     NKANo Known Allergies (Verified  Allergy, Unknown, 4/24/07)


Vitals & I&Os





Vital Signs








  Date Time  Temp Pulse Resp B/P (MAP) Pulse Ox O2 Delivery O2 Flow Rate FiO2


 


9/24/21 07:35 36.7       


 


9/24/21 07:00  68      


 


9/24/21 04:00   24 180/109 99 Room Air 2.00 








General Appearance:  No Apparent Distress, WD/WN


Cardiovascular:  Regular Rate, Rhythm, No Murmur


Gastrointestinal:  Normal Bowel Sounds, Non Tender, Soft


Neurologic/Psychiatric:  Alert, Oriented x3





Hospital Course


Patient was admitted to the hospital due to atrial fibrillation with rapid 

ventricular rate.  She was treated with a Cardizem drip without improvement in 

her rate and she remained in A. fib.  Cardiology was consulted and performed a 

cardioversion.  This restored her to sinus rhythm.  She feels much better and 

had an otherwise uneventful hospital stay.  She is to follow up with Dr Araiza

and Dr Huff to follow up this hospital stay.


Labs (last 24 hrs)


Laboratory Tests


9/24/21 04:45: 


White Blood Count 9.1, Red Blood Count 4.54, Hemoglobin 12.5, Hematocrit 39, 

Mean Corpuscular Volume 86, Mean Corpuscular Hemoglobin 28, Mean Corpuscular 

Hemoglobin Concent 32, Red Cell Distribution Width 14.6H, Platelet Count 177, 

Mean Platelet Volume 11.7, Immature Granulocyte % (Auto) 0, Neutrophils (%) 

(Auto) 78H, Lymphocytes (%) (Auto) 13, Monocytes (%) (Auto) 7, Eosinophils (%) 

(Auto) 2, Basophils (%) (Auto) 0, Neutrophils # (Auto) 7.1, Lymphocytes # (Auto)

1.2, Monocytes # (Auto) 0.7, Eosinophils # (Auto) 0.2, Basophils # (Auto) 0.0, 

Immature Granulocyte # (Auto) 0.0, Sodium Level 138, Potassium Level 4.2, 

Chloride Level 104, Carbon Dioxide Level 23, Anion Gap 11, Blood Urea Nitrogen 

11, Creatinine 0.80, Estimat Glomerular Filtration Rate 69, BUN/Creatinine Ratio

14, Glucose Level 112H, Calcium Level 8.8, Corrected Calcium 9.1, Phosphorus 

Level 3.2, Magnesium Level 2.1, Total Bilirubin 1.0, Aspartate Amino Transf 

(AST/SGOT) 16, Alanine Aminotransferase (ALT/SGPT) 27, Alkaline Phosphatase 76, 

Total Protein 6.3L, Albumin 3.6





Microbiology


9/22/21 MRSA Screen - Final, Complete


          MRSA not isolated


Patient resulted labs reviewed.


Pending Labs


Laboratory Tests


9/24/21 04:45: 


White Blood Count 9.1, Red Blood Count 4.54, Hemoglobin 12.5, Hematocrit 39, 

Mean Corpuscular Volume 86, Mean Corpuscular Hemoglobin 28, Mean Corpuscular Hem

oglobin Concent 32, Red Cell Distribution Width 14.6, Platelet Count 177, Mean 

Platelet Volume 11.7, Immature Granulocyte % (Auto) 0, Neutrophils (%) (Auto) 

78, Lymphocytes (%) (Auto) 13, Monocytes (%) (Auto) 7, Eosinophils (%) (Auto) 2,

Basophils (%) (Auto) 0, Neutrophils # (Auto) 7.1, Lymphocytes # (Auto) 1.2, 

Monocytes # (Auto) 0.7, Eosinophils # (Auto) 0.2, Basophils # (Auto) 0.0, 

Immature Granulocyte # (Auto) 0.0, Sodium Level 138, Potassium Level 4.2, 

Chloride Level 104, Carbon Dioxide Level 23, Anion Gap 11, Blood Urea Nitrogen 

11, Creatinine 0.80, Estimat Glomerular Filtration Rate 69, BUN/Creatinine Ratio

14, Glucose Level 112, Calcium Level 8.8, Corrected Calcium 9.1, Phosphorus 

Level 3.2, Magnesium Level 2.1, Total Bilirubin 1.0, Aspartate Amino Transf 

(AST/SGOT) 16, Alanine Aminotransferase (ALT/SGPT) 27, Alkaline Phosphatase 76, 

Total Protein 6.3, Albumin 3.6





Imaging:  Reviewed Imaging Report





Discussion & Recommendations


Discharge Planning:  >30 minutes discharge planning





Discharge


Home Medications:





Active Scripts


Active


Reported


Eliquis (Apixaban) 5 Mg Tablet 5 Mg PO BID


Aspirin EC (Aspirin) 81 Mg Tablet.dr 81 Mg PO DAILY


Metoprolol Succinate 50 Mg Tab.er.24h 50 Mg PO 1400


Fish Oil 1,200 mg Softgel (Omega-3S/Dha/Epa/Fish Oil) 1 Each Capsule 1 Each PO 1

400


Vitamin D3 (Cholecalciferol (Vitamin D3)) 25 Mcg Capsule 25 Mcg PO DAILY


Atorvastatin Calcium 20 Mg Tablet 20 Mg PO 1400


Amlodipine Besylate 5 Mg Tablet 5 Mg PO 1400


     LAST FILLED 03- #90/90 DAY SUPPLY


Minoxidil 10 Mg Tab 10 Mg PO BID


Lisinopril 20 Mg Tablet 20 Mg PO 1400





Instructions to patient/family


Please see electronic discharge instructions given to patient.





Copy


Copies To 1:   TISH ARAIZA KATELYN M MD              Sep 24, 2021 08:35

## 2021-09-24 NOTE — PROGRESS NOTE - CARDIOLOGY
Cardiology SOAP Progress Note


Subjective:


No cp or palp or syncope


Some gen malaise and weakness, albeit improving


No n/v/d





Objective:


I&O/Vital Signs











 9/23/21 9/24/21 9/24/21 9/24/21





 23:29 01:00 04:00 07:00


 


Temp 36.7  36.4 


 


Pulse 67 70 70 68


 


Resp 18  24 


 


B/P (MAP) 159/64  180/109 


 


Pulse Ox 95  99 


 


O2 Delivery Nasal Cannula  Room Air 


 


O2 Flow Rate 3.00  2.00 


 


    





 9/24/21 9/24/21  





 07:35 09:27  


 


Temp 36.7   


 


Pulse Ox  93  


 


O2 Delivery  Room Air  














 9/23/21





 23:59


 


Intake Total 1380 ml


 


Output Total 850 ml


 


Balance 530 ml








Weight (Pounds):  140


Weight (Ounces):  0.0


Weight (Calculated Kilograms):  63.030556


Constitutional:  AAO x 3, well-developed, well-nourished


Respiratory:  No accessory muscle use, No respiratory distress; chest expansion 

is symmetric, chest is bilaterally symmetric, lungs clear to auscultation


Cardiovascular:  irregularly irregular; No JVD; tachycardia


Gastrointestional:  No tender; soft, round, audible bowel sounds


Extremities:  other (mild right ankle swelling)


Neurologic/Psychiatric:  grossly intact (moves all extremities)


Skin:  No rash on exposed areas, No ulcerations on exposed areas





Results/Procedures:


Labs


Laboratory Tests


9/24/21 04:45: 


White Blood Count 9.1, Red Blood Count 4.54, Hemoglobin 12.5, Hematocrit 39, 

Mean Corpuscular Volume 86, Mean Corpuscular Hemoglobin 28, Mean Corpuscular 

Hemoglobin Concent 32, Red Cell Distribution Width 14.6H, Platelet Count 177, 

Mean Platelet Volume 11.7, Immature Granulocyte % (Auto) 0, Neutrophils (%) 

(Auto) 78H, Lymphocytes (%) (Auto) 13, Monocytes (%) (Auto) 7, Eosinophils (%) 

(Auto) 2, Basophils (%) (Auto) 0, Neutrophils # (Auto) 7.1, Lymphocytes # (Auto)

1.2, Monocytes # (Auto) 0.7, Eosinophils # (Auto) 0.2, Basophils # (Auto) 0.0, 

Immature Granulocyte # (Auto) 0.0, Sodium Level 138, Potassium Level 4.2, Chlo

ride Level 104, Carbon Dioxide Level 23, Anion Gap 11, Blood Urea Nitrogen 11, 

Creatinine 0.80, Estimat Glomerular Filtration Rate 69, BUN/Creatinine Ratio 14,

Glucose Level 112H, Calcium Level 8.8, Corrected Calcium 9.1, Phosphorus Level 

3.2, Magnesium Level 2.1, Total Bilirubin 1.0, Aspartate Amino Transf (AST/SGOT)

16, Alanine Aminotransferase (ALT/SGPT) 27, Alkaline Phosphatase 76, Total 

Protein 6.3L, Albumin 3.6





Microbiology


9/22/21 MRSA Screen - Final, Complete


          MRSA not isolated





Laboratory Tests


9/22/21 12:27








9/23/21 04:50








9/24/21 04:45











A/P:


Assessment:





Paroxysmal atrial fibrillation - continuing a-fib with RVR, treated with elec CV

on 9/23/21


- first diagnosed March 2021 hospital admission


- TSH WNL on 9-23-21


- OAC with Eliquis





Minimally elevated troponin (Type 2 MI)


- likely secondary to a-fib with RVR





Coronary artery disease


- history of CABG done in 2001


- cardiac catheterization in February 2016: LAD occluded at the midportion, the 

distal LAD protected by patent LIMA; high diagonal branch stent stent known to 

be Promus 2.2 x 12 mm placed in 2012; the circumflex had a patent stent in the 

proximal portion; RCA was occluded proximally protected by patent vein graft to 

the distal right coronary artery.


- Patient had an abnormal stress test in April 2018, attempt for cardiac 

catheterization failed due to the extensive disease in her thoracic and a

bdominal aorta. Medical therapy recommended


- Echocardiogram of 3-15-21 by Dr. Cabrera showed LVEF 55-65%.  LA mod dilated.  

RA dilated.  PASP 30-35 mmHg





History of thoracic and abdominal aneurysm


- status post repair done by Dr. Alcaraz in [place] in 2013.





Peripheral arterial disease 


- left lower extremity aneurysm, following with [facility] (Dr Alcaraz).





Hypertension





Hyperlipidemia





History of carotid stenosis


-  followed by Dr. Alcaraz at [facility] 





History of intermittent hypokalemia


Plan:





BP elevated today. Treat BP and PAF with long-acting beta-blocker and CCB (

Toprol  and Cardizem  daily)


Continue OAC with Eliquis


Continue ASA 


I discussed her case in detail with Dr Engle today


Close outpt f/u advised











DAVID YOO MD FACP FAC CCDS   Sep 24, 2021 10:42

## 2022-01-15 ENCOUNTER — HOSPITAL ENCOUNTER (INPATIENT)
Dept: HOSPITAL 75 - ER | Age: 77
LOS: 2 days | Discharge: HOME | DRG: 291 | End: 2022-01-17
Attending: INTERNAL MEDICINE | Admitting: INTERNAL MEDICINE
Payer: MEDICARE

## 2022-01-15 VITALS — DIASTOLIC BLOOD PRESSURE: 109 MMHG | SYSTOLIC BLOOD PRESSURE: 153 MMHG

## 2022-01-15 VITALS — HEIGHT: 60.98 IN | WEIGHT: 127.43 LBS | BODY MASS INDEX: 24.06 KG/M2

## 2022-01-15 DIAGNOSIS — Z20.822: ICD-10-CM

## 2022-01-15 DIAGNOSIS — Z79.82: ICD-10-CM

## 2022-01-15 DIAGNOSIS — Z95.5: ICD-10-CM

## 2022-01-15 DIAGNOSIS — Z87.891: ICD-10-CM

## 2022-01-15 DIAGNOSIS — Z79.01: ICD-10-CM

## 2022-01-15 DIAGNOSIS — E78.00: ICD-10-CM

## 2022-01-15 DIAGNOSIS — I25.2: ICD-10-CM

## 2022-01-15 DIAGNOSIS — I25.10: ICD-10-CM

## 2022-01-15 DIAGNOSIS — Z95.1: ICD-10-CM

## 2022-01-15 DIAGNOSIS — E78.5: ICD-10-CM

## 2022-01-15 DIAGNOSIS — I48.0: ICD-10-CM

## 2022-01-15 DIAGNOSIS — I50.31: ICD-10-CM

## 2022-01-15 DIAGNOSIS — I72.4: ICD-10-CM

## 2022-01-15 DIAGNOSIS — I11.0: Primary | ICD-10-CM

## 2022-01-15 DIAGNOSIS — Z79.899: ICD-10-CM

## 2022-01-15 LAB
ALBUMIN SERPL-MCNC: 4.1 GM/DL (ref 3.2–4.5)
ALP SERPL-CCNC: 86 U/L (ref 40–136)
ALT SERPL-CCNC: 45 U/L (ref 0–55)
APTT BLD: 32 SEC (ref 24–35)
BASOPHILS # BLD AUTO: 0.1 10^3/UL (ref 0–0.1)
BASOPHILS NFR BLD AUTO: 1 % (ref 0–10)
BILIRUB SERPL-MCNC: 0.9 MG/DL (ref 0.1–1)
BUN/CREAT SERPL: 20
CALCIUM SERPL-MCNC: 9.1 MG/DL (ref 8.5–10.1)
CHLORIDE SERPL-SCNC: 108 MMOL/L (ref 98–107)
CK MB SERPL-MCNC: 1.4 NG/ML (ref ?–6.6)
CK SERPL-CCNC: 53 U/L (ref 29–168)
CO2 SERPL-SCNC: 23 MMOL/L (ref 21–32)
CREAT SERPL-MCNC: 0.87 MG/DL (ref 0.6–1.3)
EOSINOPHIL # BLD AUTO: 0.1 10^3/UL (ref 0–0.3)
EOSINOPHIL NFR BLD AUTO: 1 % (ref 0–10)
GFR SERPLBLD BASED ON 1.73 SQ M-ARVRAT: 68 ML/MIN
GLUCOSE SERPL-MCNC: 149 MG/DL (ref 70–105)
HCT VFR BLD CALC: 43 % (ref 35–52)
HGB BLD-MCNC: 13.9 G/DL (ref 11.5–16)
INR PPP: 1.5 (ref 0.8–1.4)
LYMPHOCYTES # BLD AUTO: 1.1 10^3/UL (ref 1–4)
LYMPHOCYTES NFR BLD AUTO: 11 % (ref 12–44)
MAGNESIUM SERPL-MCNC: 2.1 MG/DL (ref 1.6–2.4)
MANUAL DIFFERENTIAL PERFORMED BLD QL: NO
MCH RBC QN AUTO: 28 PG (ref 25–34)
MCHC RBC AUTO-ENTMCNC: 32 G/DL (ref 32–36)
MCV RBC AUTO: 88 FL (ref 80–99)
MONOCYTES # BLD AUTO: 0.7 10^3/UL (ref 0–1)
MONOCYTES NFR BLD AUTO: 6 % (ref 0–12)
NEUTROPHILS # BLD AUTO: 8.3 10^3/UL (ref 1.8–7.8)
NEUTROPHILS NFR BLD AUTO: 81 % (ref 42–75)
PLATELET # BLD: 229 10^3/UL (ref 130–400)
PMV BLD AUTO: 11.6 FL (ref 9–12.2)
POTASSIUM SERPL-SCNC: 3.6 MMOL/L (ref 3.6–5)
PROT SERPL-MCNC: 6.9 GM/DL (ref 6.4–8.2)
PROTHROMBIN TIME: 18.5 SEC (ref 12.2–14.7)
SODIUM SERPL-SCNC: 142 MMOL/L (ref 135–145)
TSH SERPL DL<=0.05 MIU/L-ACNC: 1.56 UIU/ML (ref 0.35–4.94)
WBC # BLD AUTO: 10.3 10^3/UL (ref 4.3–11)

## 2022-01-15 PROCEDURE — 84484 ASSAY OF TROPONIN QUANT: CPT

## 2022-01-15 PROCEDURE — 36415 COLL VENOUS BLD VENIPUNCTURE: CPT

## 2022-01-15 PROCEDURE — 84443 ASSAY THYROID STIM HORMONE: CPT

## 2022-01-15 PROCEDURE — 85730 THROMBOPLASTIN TIME PARTIAL: CPT

## 2022-01-15 PROCEDURE — 83880 ASSAY OF NATRIURETIC PEPTIDE: CPT

## 2022-01-15 PROCEDURE — 71045 X-RAY EXAM CHEST 1 VIEW: CPT

## 2022-01-15 PROCEDURE — 87636 SARSCOV2 & INF A&B AMP PRB: CPT

## 2022-01-15 PROCEDURE — 82553 CREATINE MB FRACTION: CPT

## 2022-01-15 PROCEDURE — 83874 ASSAY OF MYOGLOBIN: CPT

## 2022-01-15 PROCEDURE — 94640 AIRWAY INHALATION TREATMENT: CPT

## 2022-01-15 PROCEDURE — 85025 COMPLETE CBC W/AUTO DIFF WBC: CPT

## 2022-01-15 PROCEDURE — 83735 ASSAY OF MAGNESIUM: CPT

## 2022-01-15 PROCEDURE — 82550 ASSAY OF CK (CPK): CPT

## 2022-01-15 PROCEDURE — 80053 COMPREHEN METABOLIC PANEL: CPT

## 2022-01-15 PROCEDURE — 93005 ELECTROCARDIOGRAM TRACING: CPT

## 2022-01-15 PROCEDURE — 85610 PROTHROMBIN TIME: CPT

## 2022-01-15 PROCEDURE — 93041 RHYTHM ECG TRACING: CPT

## 2022-01-15 RX ADMIN — Medication SCH MLS/HR: at 20:33

## 2022-01-15 RX ADMIN — POTASSIUM CHLORIDE SCH MEQ: 1500 TABLET, EXTENDED RELEASE ORAL at 20:33

## 2022-01-15 RX ADMIN — Medication SCH MLS/HR: at 06:00

## 2022-01-15 RX ADMIN — ALBUTEROL SULFATE SCH MG: 2.5 SOLUTION RESPIRATORY (INHALATION) at 20:29

## 2022-01-15 RX ADMIN — SENNOSIDES SCH MG: 8.6 TABLET, FILM COATED ORAL at 20:33

## 2022-01-15 RX ADMIN — DOCUSATE SODIUM SCH MG: 100 CAPSULE ORAL at 20:33

## 2022-01-15 RX ADMIN — FUROSEMIDE SCH MG: 10 INJECTION, SOLUTION INTRAVENOUS at 16:26

## 2022-01-15 RX ADMIN — Medication SCH MLS/HR: at 10:25

## 2022-01-15 RX ADMIN — APIXABAN SCH MG: 5 TABLET, FILM COATED ORAL at 20:33

## 2022-01-15 RX ADMIN — DEXTROSE SCH MLS/HR: 5 SOLUTION INTRAVENOUS at 16:27

## 2022-01-15 NOTE — DIAGNOSTIC IMAGING REPORT
HISTORY: Chest pain



TECHNIQUE: Frontal view of the chest.



COMPARISON: 09/23/2021



FINDINGS:



Cardiomegaly appears stable. Sternotomy wires and post-CABG

changes are noted. There is a small right pleural effusion. No

pneumothorax is seen. No focal consolidation is seen.



IMPRESSION:

1. Stable cardiomegaly with minimal right pleural effusion.



Dictated by: 



  Dictated on workstation # [id number]

## 2022-01-15 NOTE — TELE-ICU PROGRESS NOTE
Progress Note


Video assessment done , Hemodynamically stable  





Available charting reviewed 





  





NO TELE-ICU CONSULT REQUESTED  CONTINUE TO MONITOR AS PER USUAL TELE-ICU 

PROTOCOL 





No need for Tele-ICU interventions  





Plans as delineated by bedside physicians / consultants





Focused Exam


Height, Weight, BMI


Height: 5'0.00"


Weight: 140lbs. 0.0oz. [id number]; 26.58 BMI


Method:Stated











MITCHEL ROLON MD         Dusty 15, 2022 11:43 back/abdomen

## 2022-01-15 NOTE — CONSULTATION-CARDIOLOGY
HPI-Cardiology


Cardiology Consultation:


Date of Consultation


1/15/22


Time Seen by a Provider:  13:55


Date of Admission





Attending Physician


Feli Longoria DO


Admitting Physician


Adams Araiza DO


Consulting Physician


DAVID YOO MD, MA, FACP, FACC, FSCAI, CCDS





HPI:


Chief Complaint:


Palpitations, shortness of breath, chest discomfort





76 yr old female admitted to Dr Longoria with a-fib with RVR. She had had palpita

tions and generalized, vague chest discomfort.  She reports she was feeling 

weak, lightheaded and SOB.  No report of syncope or near syncope. She reports 

chronic mild to mod bilat ankle swelling, R>L, which is unchanged.  She denies 

missing any medication doses including OAC.  She does not report any n/v/d.  No 

c/o fever or chills.  She reports she is feeling better at this time (after 

having been started on iv diltiazem in the ER)





Review of Systems-Cardiology


Review of Systems


Constitutional:  malaise, tiredness; No weight loss, No weight gain


Eyes:  No vision change


Ears/Nose/Throat:  No ear discharge, No nasal drainage, No recent hearing loss


Respiratory:  As described under HPI


Cardiovascular:  As described under HPI


Gastrointestinal:  No diarrhea, No nausea, No vomiting


Genitourinary:  No dysuria, No hematuria


Musculoskeletal:  back pain (chronic)


Skin:  No rash, No ulcerations


Psychiatric/Neurological:  No seizure, No focal weakness, No syncope


Hematologic:  No bleeding abnormalities





PMH-Social-Family Hx


Patient Social History


Smoking Status:  Former Smoker


2nd Hand Smoke Exposure:  No


Have you traveled recently?:  No


Alcohol Use?:  No


Pt feels they are or have been:  No


Tobacco type used:  Cigarettes





Immunizations Up To Date


Tetanus Booster (TDap):  More than 5yrs


Date of Pneumonia Vaccine:  Jan 25, 2007





Past Medical History


PMH


As described under Assessment.





Family Medical History


Family Medical History:  


She reports her father and mother both had strokes.  She does not report


any premature CAD or SCD.


Family History:  


FHx: stroke


  19 FATHER


  19 MOTHER





Allergies and Home Medications


Allergies


Coded Allergies:  


     NKANo Known Allergies (Verified  Allergy, Unknown, 4/24/07)





Patient Home Medication List


Home Medication List Reviewed:  Yes


Apixaban (Eliquis) 5 Mg Tablet, 5 MG PO BID, (Reported)


   Entered as Reported by: IGNACIO CARPENTER on 9/22/21 1621


Aspirin (Aspirin EC) 81 Mg Tablet.dr, 81 MG PO DAILY, (Reported)


   Entered as Reported by: IGNACIO CARPENTER on 9/22/21 1559


Atorvastatin Calcium (Atorvastatin Calcium) 20 Mg Tablet, 20 MG PO 1400, 

(Reported)


   Entered as Reported by: IGNACIO CARPENTER on 3/15/21 1137


Cholecalciferol (Vitamin D3) (Vitamin D3) 25 Mcg Capsule, 25 MCG PO DAILY, 

(Reported)


   Entered as Reported by: IGNACIO CARPENTER on 3/15/21 1137


Diltiazem HCl (Diltiazem 24Hr ER) 240 Mg Cap.er.24h, 240 MG PO DAILY


   Prescribed by: PHIL LUNA on 9/24/21 0947


Lisinopril (Lisinopril) 20 Mg Tablet, 20 MG PO 1400, (Reported)


   Entered as Reported by: IRWIN RUTHERFORD on 2/1/16 2216


Metoprolol Succinate (Metoprolol Succinate) 100 Mg Tab.er.24h, 100 MG PO DAILY


   Prescribed by: PHIL LUNA on 9/24/21 0947


Minoxidil (Minoxidil) 10 Mg Tab, 10 MG PO BID, (Reported)


   Entered as Reported by: ELIZABETH MCNALLY on 4/17/18 0940


Stanton-3S/Dha/Epa/Fish Oil (Fish Oil 1,200 mg Softgel) 1 Each Capsule, 1 EACH PO 

1400, (Reported)


   Entered as Reported by: IGNACIO CARPENTER on 3/15/21 1137





Physical Exam-Cardiology


Physical Exam


Vital Signs/I&O











 1/15/22 1/15/22 1/15/22 1/15/22





 05:36 05:50 09:38 09:55


 


Temp 36.7   


 


Pulse 151  108 


 


Resp 16  18 


 


B/P (MAP) 132/112 (119)  122/95 


 


Pulse Ox 94  95 96


 


O2 Delivery Room Air Nasal Cannula  NIV Bilevel


 


O2 Flow Rate  2.00  3.00


 


    





 1/15/22 1/15/22 1/15/22 1/15/22





 09:55 10:12 11:00 12:00


 


Temp 36.6   


 


Pulse 126 106 95 107


 


Resp 20  20 16


 


B/P (MAP) 127/91  150/100 


 


Pulse Ox 96  99 100


 


O2 Delivery Nasal Cannula  Nasal Cannula Nasal Cannula


 


O2 Flow Rate 3.00  3.00 3.00


 


    





 1/15/22 1/15/22 1/15/22 1/15/22





 12:39 13:00 13:37 14:00


 


Temp   36.6 


 


Pulse 90 117 117 93


 


Resp  23  31


 


B/P (MAP)  153/109  130/94


 


Pulse Ox  99 99 97


 


O2 Delivery  Nasal Cannula  Nasal Cannula


 


O2 Flow Rate  3.00  3.00


 


    





 1/15/22   





 15:00   


 


Pulse 98   


 


Resp 23   


 


B/P (MAP) 122/92   


 


Pulse Ox 97   


 


O2 Delivery Nasal Cannula   


 


O2 Flow Rate 3.00   





Capillary Refill : Less Than 3 Seconds


Constitutional:  AAO x 3, well-developed, other (thin and frail)


HEENT:  PERRL, EOMI, hearing is well preserved; No xanthelasmas are seen


Neck:  carotid pulses are 2 + bilaterally, with good upstrokes


Respiratory:  No accessory muscle use; other (fair to good, bilateral air entry,

diminished at the bases)


Cardiovascular:  irregularly irregular, S1 and S2, systolic murmur (soft MARIAN at 

the card base)


Gastrointestinal:  No tender; soft; No guarding, No rebound; audible bowel so

unds


Extremities:  No clubbing, No cyanosis, No significant edema


Skin:  No rash on exposed areas, No ulcerations on exposed areas





Data Review


Labs


Laboratory Tests


1/15/22 05:55: 


White Blood Count 10.3, Red Blood Count 4.91, Hemoglobin 13.9, Hematocrit 43, 

Mean Corpuscular Volume 88, Mean Corpuscular Hemoglobin 28, Mean Corpuscular 

Hemoglobin Concent 32, Red Cell Distribution Width 15.2H, Platelet Count 229, 

Mean Platelet Volume 11.6, Immature Granulocyte % (Auto) 1, Neutrophils (%) 

(Auto) 81H, Lymphocytes (%) (Auto) 11L, Monocytes (%) (Auto) 6, Eosinophils (%) 

(Auto) 1, Basophils (%) (Auto) 1, Neutrophils # (Auto) 8.3H, Lymphocytes # 

(Auto) 1.1, Monocytes # (Auto) 0.7, Eosinophils # (Auto) 0.1, Basophils # (Auto)

0.1, Immature Granulocyte # (Auto) 0.1, Prothrombin Time 18.5H, INR Comment 1.5H

, Activated Partial Thromboplast Time 32, Sodium Level 142, Potassium Level 3.6,

Chloride Level 108H, Carbon Dioxide Level 23, Anion Gap 11, Blood Urea Nitrogen 

17, Creatinine 0.87, Estimat Glomerular Filtration Rate 68, BUN/Creatinine Ratio

20, Glucose Level 149H, Calcium Level 9.1, Corrected Calcium 9.0, Magnesium 

Level 2.1, Total Bilirubin 0.9, Aspartate Amino Transf (AST/SGOT) 21, Alanine 

Aminotransferase (ALT/SGPT) 45, Alkaline Phosphatase 86, Total Creatine Kinase 

53, Creatine Kinase MB 1.4, Myoglobin 39.3, Troponin I < 0.028, B-Type 

Natriuretic Peptide 1042.9H, Total Protein 6.9, Albumin 4.1, TSH Cascade Testing

1.56


1/15/22 06:18: 


Influenza Type A (RT-PCR) Not Detected, Influenza Type B (RT-PCR) Not Detected, 

SARS-CoV-2 RNA (RT-PCR) Not Detected





Laboratory Tests


1/15/22 05:55











A/P-Cardiology


Assessment/Admission Diagnosis


Paroxysmal atrial fibrillation


- treated with elec CV on 9/23/21


- first diagnosed March 2021 hospital admission


- TSH WNL on 9-23-21


- OAC with Eliquis





Ac diastolic CHF, likely due to PAF with RVR





Coronary artery disease


- history of CABG done in 2001


- cardiac catheterization in February 2016: LAD occluded at the midportion, the 

distal LAD protected by patent LIMA; high diagonal branch stent stent known to 

be Promus 2.2 x 12 mm placed in 2012; the circumflex had a patent stent in the 

proximal portion; RCA was occluded proximally protected by patent vein graft to 

the distal right coronary artery.


- Patient had an abnormal stress test in April 2018, attempt for cardiac 

catheterization failed due to the extensive disease in her thoracic and 

abdominal aorta. Medical therapy recommended


- Echocardiogram of 3-15-21 by Dr. Cabrera showed LVEF 55-65%. LA mod dilated. RA 

dilated. PASP 30-35 mmHg





History of thoracic and abdominal aneurysm


- status post repair done by Dr. Alcaraz in [place] in 2013.





Peripheral arterial disease


- left lower extremity aneurysm, [facility] (Dr Alcaraz).





Hypertension


- controlled





Hyperlipidemia


- statin tx - followed by PCP





History of carotid stenosis


- followed by Dr. Alcaraz at [facility]





History of intermittent hypokalemia


- managed by PCP





Discussion and Recomendations





* iv dilt for vent rate control


* Amiodarone for rhythm maintenance


* Continue Eliquis for stroke prophylaxis (she is on it chronically and has not 

  missed any doses)


* Elec CV if amiodarone does not work. Maintenance of NSR in the past has 

  resulted in her maintaining an aysmptomatic status


* Diuretics for decomp CHF


* Replenish lytes


* Monitor labs


* I discussed her CV issues with her and also our treatment plan





Clinical Quality Measures


AMI/AHF:


ASA po Prior to arrival:  DAVID Garcia MD FACP FAC CCDS   Dusty 15, 2022 15:19

## 2022-01-15 NOTE — HISTORY & PHYSICAL-HOSPITALIST
History of Present Illness


HPI/Chief Complaint


Chief complaint: Atrial fibrillation with rapid ventricular response





History of present illness: This is a 76-year-old white female clinic patient of

Dr. Araiza and Dr. Huff who has a past medical history of chronic atrial 

fibrillation maintained on anticoagulation.  She reports that she began feeling 

short of breath and palpitations so she called 911 and ambulance brought her to 

the ER patient was found to have A. fib with RVR.  Currently she is on a 

Cardizem drip.  Anticoagulation was restarted.  Dr. Huff consulted.  Volume 

overload noted so Lasix will be given.


Source:  patient


Exam Limitations:  no limitations


Date Seen


1/15/22


Time Seen by a Provider:  11:30


Attending Physician





PCP


Adams Araiza DO


Referring Physician





Date of Admission








Home Medications & Allergies


Home Medications


Reviewed patient Home Medication Reconciliation performed by pharmacy medication

reconciliations technician and/or nursing.


Patients Allergies have been reviewed.





Allergies





Allergies


Coded Allergies


  NKANo Known Allergies (Verified Allergy, Unknown, 4/24/07)








Past Medical-Social-Family Hx


Patient Social History


Marrital Status:  single


Employed/Student:  retired


Tobacco Use?:  Yes


Tobacco type used:  Cigarettes


Smoking Status:  Former Smoker


Substance use?:  No


Alcohol Use?:  No





Immunizations Up To Date


First/Initial COVID19 Vaccinat:  UNVACCINATED


Second COVID19 Vaccination Vick:  UNVACCINATED


Tetanus Booster (TDap):  Unknown


Hepatitis A:  No


Hepatitis B:  No


PED Vaccines UTD:  No


Date of Pneumonia Vaccine:  Jan 25, 2007





Seasonal Allergies


Seasonal Allergies:  No





Current Status


Primary Language:  English


Preferred Spoken Language:  English





Past Medical History


Surgeries:  Abdominal, Cardiac, CABG, Coronary Stent, Tonsillectomy, Vascular 

Surgery


Sleep Apnea, COPD


Currently Using CPAP:  No


Currently Using BIPAP:  No


Aneurysm, Atrial Fibrillation, Chronic Edema/Swelling, Coronary Artery Disease, 

Heart Attack, High Cholesterol, Hypertension, Peripheral Vascular


GYN History:  Menopausal


Anxiety


Blood Disorders:  No


Adverse Reaction/Blood Tranf:  No





Family Medical History





FHx: stroke


  19 FATHER


  19 MOTHER


No Pertinent Family Hx





- history of CABG done in 2001


- cardiac catheterization in February 2016: LAD occluded at the


midportion, the distal LAD protected by patent LIMA; high diagonal branch


stent stent known to be Promus 2.2 x 12 mm placed in 2012; the circumflex


had a patent stent in the proximal portion; RCA was occluded proximally


protected by patent vein graft to the distal right coronary artery.


- Patient had an abnormal stress test in April 2018, attempt for cardiac


catheterization failed due to the extensive disease in her thoracic and


abdominal aorta. Medical therapy recommended


- Echocardiogram of 3-15-21 by Dr. Cabrera showed LVEF 55-65%.  LA mod


dilated.  RA dilated.  PASP 30-35 mmHg





History of thoracic and abdominal aneurysm


- status post repair done by Dr. Alcaraz in [place] in 2013.





Peripheral arterial disease 


- left lower extremity aneurysm, following with [facility]


(Dr Alcaraz).





History of carotid stenosis


-  followed by Dr. Alcaraz at [facility]





Review of Systems


Constitutional:  see HPI, dizziness, malaise, weakness


EENTM:  no symptoms reported


Respiratory:  cough, dyspnea on exertion


Cardiovascular:  chest pain, palpitations


Gastrointestinal:  no symptoms reported


Genitourinary:  decreased output


Musculoskeletal:  back pain, joint pain


Skin:  no symptoms reported


Psychiatric/Neurological:  Anxiety


All Other Systems Reviewed


Negative Unless Noted:  Yes





Physical Exam


Physical Exam


Vital Signs





Vital Signs - First Documented








 1/15/22 1/15/22





 05:36 05:50


 


Temp 36.7 


 


Pulse 151 


 


Resp 16 


 


B/P (MAP) 132/112 (119) 


 


Pulse Ox 94 


 


O2 Delivery Room Air 


 


O2 Flow Rate  2.00





Capillary Refill : Less Than 3 Seconds


Height, Weight, BMI


Height: 5'0.00"


Weight: 140lbs. 0.0oz. [id number]; 25.35 BMI


Method:Stated


General Appearance:  Anxious, Chronically ill, Mild Distress


Eyes:  Right Eye Normal Inspection, Right Eye PERRL


HEENT:  PERRL/EOMI, Normal ENT Inspection, Pharynx Normal, Moist Mucous 

Membranes


Neck:  Full Range of Motion, Normal Inspection, Non Tender


Respiratory:  Chest Non Tender, Lungs Clear, No Accessory Muscle Use, No 

Respiratory Distress, Decreased Breath Sounds


Cardiovascular:  No Edema, No Gallop, No JVD, No Murmur, Normal Peripheral 

Pulses, Irregularly Irregular, Tachycardia


Gastrointestinal:  Normal Bowel Sounds, No Organomegaly, No Pulsatile Mass, Non 

Tender, Soft


Back:  Normal Inspection, No CVA Tenderness, No Vertebral Tenderness


Extremity:  Normal Capillary Refill, Normal Inspection, Normal Range of Motion, 

Non Tender, No Calf Tenderness, No Pedal Edema


Neurologic/Psychiatric:  Alert, Oriented x3, No Motor/Sensory Deficits, Normal 

Mood/Affect


Skin:  Normal Color, Warm/Dry


Lymphatic:  No Adenopathy





Results


Results/Procedures


Labs


Laboratory Tests


1/15/22 05:55








1/16/22 04:44








Patient resulted labs reviewed.





Assessment/Plan


Admission Diagnosis


Assessment:


A. fib with RVR


Volume overload


Smoker


CAD


History of bypass


Hypertension


Hyperlipidemia





Plan:


Cardizem drip


Anticoagulation


ICU care


Lasix


Admission Status:  Inpatient Order (span 2 midnights)


Reason for Inpatient Admission:  


A. fib with RVR with CHF





Diagnosis/Problems


Diagnosis/Problems





(1) Atrial fibrillation with RVR


Status:  Acute


(2) Chest pain


Status:  Acute


Qualifiers:  


   Chest pain type:  unspecified  Qualified Codes:  R07.9 - Chest pain, 

unspecified


(3) CHF (congestive heart failure)


Status:  Acute


Qualifiers:  


   Heart failure type:  unspecified  Heart failure chronicity:  acute on chronic

 Qualified Codes:  I50.9 - Heart failure, unspecified


(4) ASVD (arteriosclerotic vascular disease)


Status:  Acute





Clinical Quality Measures


AMI/AHF:


ASA po Prior to arrival:  SEAMUS Villalobos DO                Dusty 15, 2022 07:36

## 2022-01-15 NOTE — ED CARDIAC GENERAL
History of Present Illness


General


Stated Complaint:  CP


Source:  patient, EMS


 (GABRIELE FELIPE DO)





History of Present Illness


Date Seen by Provider:  Dusty 15, 2022


Time Seen by Provider:  05:37


Initial Comments


PT ARRIVES VIA EMS FROM HOME 


STATES SHE WOKE UP AT 0300 WITH CHEST PAIN/PRESSURE AND FELT LIKE HER HEART WAS 

RACING


+ SHORTNESS OF BREATH


+ SWEATS


+ NAUSEA, NO VOMITING


+ SWELLING IN LEGS/FEET





STATES SHE FEELS BETTER NOW AND THOUGHT IT WAS JUST ANXIETY





EMS REPORT HEART RATE UP 'S


NO IV OR TREATMENT BY EMS. 





PT HAS HISTORY OF ATRIAL FIB/RVR, HAS ALSO HAD CABG AND STENTS X 5, LEFT LEG 

ANEURYSM REPAIR, THORACIC AND AAA REPAIR


PT IS ON ELIQUIS, ASPIRIN, METOPROLOL AND MINOXIDIL. 


DENIES ANY MISSED DOSES OF MEDICATIONS, OR ANY CHANGE IN MEDICATIONS. HAS NOT 

TAKEN HER MORNING MEDICATIONS. 





PT HAS NOT HAD COVID OR FLU VACCINES





PCP: DR. DE LOS SANTOS


CARDIOLOGIST: DR. YOO


 (GABRIELE FELIPE )





Allergies and Home Medications


Allergies


Coded Allergies:  


     Ceci Known Allergies (Verified  Allergy, Unknown, 07)





Patient Home Medication List


Home Medication List Reviewed:  Yes


 (TAMMY GALLAGHER MD)


Apixaban (Eliquis) 5 Mg Tablet, 5 MG PO BID, (Reported)


   Entered as Reported by: IGNACIO CARPENTER on 21 1621


Aspirin (Aspirin EC) 81 Mg Tablet.dr, 81 MG PO DAILY, (Reported)


   Entered as Reported by: IGNACIO CARPENTER on 21 1559


Atorvastatin Calcium (Atorvastatin Calcium) 20 Mg Tablet, 20 MG PO 1400, 

(Reported)


   Entered as Reported by: IGNACIO CARPENTER on 3/15/21 1137


Cholecalciferol (Vitamin D3) (Vitamin D3) 25 Mcg Capsule, 25 MCG PO DAILY, 

(Reported)


   Entered as Reported by: IGNACIO CARPENTER on 3/15/21 1137


Diltiazem HCl (Diltiazem 24Hr ER) 240 Mg Cap.er.24h, 240 MG PO DAILY


   Prescribed by: PHIL LUNA on 21 0947


Lisinopril (Lisinopril) 20 Mg Tablet, 20 MG PO 1400, (Reported)


   Entered as Reported by: IRWIN RUTHERFORD on 16 2216


Metoprolol Succinate (Metoprolol Succinate) 100 Mg Tab.er.24h, 100 MG PO DAILY


   Prescribed by: PHIL LUNA on 21 0947


Minoxidil (Minoxidil) 10 Mg Tab, 10 MG PO BID, (Reported)


   Entered as Reported by: ELIZABETH MCNALLY on 18 0940


[place]-3S/Dha/Epa/Fish Oil (Fish Oil 1,200 mg Softgel) 1 Each Capsule, 1 EACH PO 

1400, (Reported)


   Entered as Reported by: IGNACIO CARPENTER on 3/15/21 1137





Review of Systems


Review of Systems


Constitutional:  see HPI, diaphoresis


EENTM:  No Symptoms Reported


Respiratory:  See HPI, Shortness of Air


Cardiovascular:  See HPI, Chest Pain, Edema, Irregular Heart Rate, 

Lightheadedness, Palpitations; Denies Syncope


Gastrointestinal:  Denies Abdominal Pain; Nausea; Denies Vomiting


Genitourinary:  No Symptoms Reported


Musculoskeletal:  see HPI (LEFT SWELLING)


Skin:  no symptoms reported


Psychiatric/Neurological:  Anxiety


Endocrine:  No Symptoms Reported


Hematologic/Lymphatic:  No Symptoms Reported (GABRIELE FELIPE DO)





Past Medical-Social-Family Hx


Patient Social History


Tobacco Use?:  Yes


Tobacco type used:  Cigarettes


Smoking Status:  Former Smoker


Substance use?:  No


Alcohol Use?:  No


 (GABRIELE FELIPE DO)





Immunizations Up To Date


Tetanus Booster (TDap):  More than 5yrs


PED Vaccines UTD:  No


First/Initial COVID19 Vaccinat:  UNVACCINATED


 (GABRIELE FELIPE DO)





Seasonal Allergies


Seasonal Allergies:  No


 (GABRIELE FELIPE DO)





Past Medical History


Surgery/Hospitalization HX:  


HOSPITALIZATION: 2021 - AFIB





NO SURGERIES: CABG


Surgeries:  Yes (AAA REPAIR; LEFT LEG ANURYSM REPAIR; CARDIAC STENTS X 5;CABG)


Abdominal, Cardiac, CABG, Coronary Stent, Tonsillectomy, Vascular Surgery


Respiratory:  Yes


Sleep Apnea


Currently Using CPAP:  No


Currently Using BIPAP:  No


Cardiac:  Yes (CHF; CABG; STENTS X 5; LEFT LEG ANEURYSM REPAIR;THORACIC &AAA 

REPAIR)


Aneurysm, Atrial Fibrillation, Chronic Edema/Swelling, Coronary Artery Disease, 

Heart Attack, High Cholesterol, Hypertension, Peripheral Vascular


Neurological:  No


Reproductive Disorders:  No


GYN History:  Menopausal


Gastrointestinal:  No


Musculoskeletal:  No


Endocrine:  No


Cancer:  No


Psychosocial:  Yes


Anxiety


Integumentary:  No


Blood Disorders:  No


Adverse Reaction/Blood Tranf:  No


 (GABRIELE FELIPE )





Family Medical History





FHx: stroke


  19 FATHER


  19 MOTHER


No Pertinent Family Hx





- history of CABG done in 


- cardiac catheterization in 2016: LAD occluded at the


midportion, the distal LAD protected by patent LIMA; high diagonal branch


stent stent known to be Promus 2.2 x 12 mm placed in ; the circumflex


had a patent stent in the proximal portion; RCA was occluded proximally


protected by patent vein graft to the distal right coronary artery.


- Patient had an abnormal stress test in 2018, attempt for cardiac


catheterization failed due to the extensive disease in her thoracic and


abdominal aorta. Medical therapy recommended


- Echocardiogram of 3-15-21 by Dr. Cabrera showed LVEF 55-65%.  LA mod


dilated.  RA dilated.  PASP 30-35 mmHg





History of thoracic and abdominal aneurysm


- status post repair done by Dr. Alcaraz in [place] in .





Peripheral arterial disease 


- left lower extremity aneurysm, [facility]


(Dr Alcaraz).





History of carotid stenosis


-  followed by Dr. Alcaraz at [facility] 


 (DESTINEEGABRIELE NICHOLS )





Physical Exam


Vital Signs





Vital Signs - First Documented








 1/15/22 1/15/22





 05:36 05:50


 


Temp 36.7 


 


Pulse 151 


 


Resp 16 


 


B/P (MAP) 132/112 (119) 


 


Pulse Ox 94 


 


O2 Delivery Room Air 


 


O2 Flow Rate  2.00





 (TAMMY GALLAGHER MD)


Vital Signs


Capillary Refill :  


 (DESTINEEGABRIELE NICHOLS )


Height, Weight, BMI


Height: 5'0.00"


Weight: 140lbs. 0.0oz. 63.265542pg; 25.35 BMI


Method:Stated


General Appearance:  WD/WN, Mild Distress (LABORED BREATHING, BUT ABLE TO TALK 

IN FULL SENTENCES. )


HEENT:  PERRL/EOMI, Other (PERIORBITAL EDEMA WITH EDEMA OF CONJUNCTIVA. )


Neck:  Normal Inspection


Respiratory:  Accessory Muscle Use (LABORED BREATHING ), Decreased Breath Sounds

(IN BASES)


Cardiovascular:  No Murmur, Irregularly Irregular, Tachycardia


Gastrointestinal:  Non Tender, Soft


Extremity:  Normal Capillary Refill, Pedal Edema (2+ EDEMA BILATERALLY)


Neurologic/Psychiatric:  Alert, Oriented x3, No Motor/Sensory Deficits, CNs II-

XII Norm as Tested, Other (ANXIOUS)


Skin:  Normal Color, Warm/Dry (DESTINEE,GABRIELE NICHOLS DO)





Progress/Results/Core Measures


Results/Orders


Lab Results





Laboratory Tests








Test


 1/15/22


05:55 1/15/22


06:18 Range/Units


 


 


White Blood Count


 10.3 


 


 4.3-11.0


10^3/uL


 


Red Blood Count


 4.91 


 


 3.80-5.11


10^6/uL


 


Hemoglobin 13.9   11.5-16.0  g/dL


 


Hematocrit 43   35-52  %


 


Mean Corpuscular Volume 88   80-99  fL


 


Mean Corpuscular Hemoglobin 28   25-34  pg


 


Mean Corpuscular Hemoglobin


Concent 32 


 


 32-36  g/dL





 


Red Cell Distribution Width 15.2 H  10.0-14.5  %


 


Platelet Count


 229 


 


 130-400


10^3/uL


 


Mean Platelet Volume 11.6   9.0-12.2  fL


 


Immature Granulocyte % (Auto) 1    %


 


Neutrophils (%) (Auto) 81 H  42-75  %


 


Lymphocytes (%) (Auto) 11 L  12-44  %


 


Monocytes (%) (Auto) 6   0-12  %


 


Eosinophils (%) (Auto) 1   0-10  %


 


Basophils (%) (Auto) 1   0-10  %


 


Neutrophils # (Auto)


 8.3 H


 


 1.8-7.8


10^3/uL


 


Lymphocytes # (Auto)


 1.1 


 


 1.0-4.0


10^3/uL


 


Monocytes # (Auto)


 0.7 


 


 0.0-1.0


10^3/uL


 


Eosinophils # (Auto)


 0.1 


 


 0.0-0.3


10^3/uL


 


Basophils # (Auto)


 0.1 


 


 0.0-0.1


10^3/uL


 


Immature Granulocyte # (Auto)


 0.1 


 


 0.0-0.1


10^3/uL


 


Prothrombin Time 18.5 H  12.2-14.7  SEC


 


INR Comment 1.5 H  0.8-1.4  


 


Activated Partial


Thromboplast Time 32 


 


 24-35  SEC





 


Sodium Level 142   135-145  MMOL/L


 


Potassium Level 3.6   3.6-5.0  MMOL/L


 


Chloride Level 108 H    MMOL/L


 


Carbon Dioxide Level 23   21-32  MMOL/L


 


Anion Gap 11   5-14  MMOL/L


 


Blood Urea Nitrogen 17   7-18  MG/DL


 


Creatinine


 0.87 


 


 0.60-1.30


MG/DL


 


Estimat Glomerular Filtration


Rate 68 


 


  





 


BUN/Creatinine Ratio 20    


 


Glucose Level 149 H    MG/DL


 


Calcium Level 9.1   8.5-10.1  MG/DL


 


Corrected Calcium 9.0   8.5-10.1  MG/DL


 


Magnesium Level 2.1   1.6-2.4  MG/DL


 


Total Bilirubin 0.9   0.1-1.0  MG/DL


 


Aspartate Amino Transf


(AST/SGOT) 21 


 


 5-34  U/L





 


Alanine Aminotransferase


(ALT/SGPT) 45 


 


 0-55  U/L





 


Alkaline Phosphatase 86     U/L


 


Total Creatine Kinase 53     U/L


 


Creatine Kinase MB 1.4   <6.6  NG/ML


 


Myoglobin


 39.3 


 


 10.0-92.0


NG/ML


 


Troponin I < 0.028   <0.028  NG/ML


 


B-Type Natriuretic Peptide 1042.9 H  <100.0  PG/ML


 


Total Protein 6.9   6.4-8.2  GM/DL


 


Albumin 4.1   3.2-4.5  GM/DL


 


TSH Cascade Testing


 1.56 


 


 0.35-4.94


UIU/ML


 


Influenza Type A (RT-PCR)  Not Detected  Not Detecte  


 


Influenza Type B (RT-PCR)  Not Detected  Not Detecte  


 


SARS-CoV-2 RNA (RT-PCR)  Not Detected  Not Detecte  





 (TAMMY GALLAGHER MD)


My Orders





Orders - TAMMY GALLAGHER MD


Metoprolol Succinate (Xl) Tab (Toprol Xl (1/15/22 07:30)


Apixaban Tablet (Eliquis Tablet) (1/15/22 07:30)


Ed Admission (Communication) (1/15/22 07:40)


 (TAMMY GALLAGHER MD)


Medications Given in ED





Current Medications








 Medications  Dose


 Ordered  Sig/Lloyd


 Route  Start Time


 Stop Time Status Last Admin


Dose Admin


 


 Aspirin  324 mg  ONCE  ONCE


 PO  1/15/22 05:45


 1/15/22 05:46 DC 1/15/22 06:00


324 MG


 


 Diltiazem HCl  20 mg  ONCE  ONCE


 IVP  1/15/22 05:45


 1/15/22 05:46 DC 1/15/22 06:00


10 MG





 (TAMMY GALLAGHER MD)


Vital Signs/I&O











 1/15/22 1/15/22





 05:36 05:50


 


Temp 36.7 


 


Pulse 151 


 


Resp 16 


 


B/P (MAP) 132/112 (119) 


 


Pulse Ox 94 


 


O2 Delivery Room Air Nasal Cannula


 


O2 Flow Rate  2.00





 (TAMMY GALLAGHER MD)





Progress


Progress Note :  


Progress Note


GIVEN ASPIRIN 


GIVEN CARDIZEM BOLUS AND PLACED IN CARDIZEM DRIP. 


HEART RATE QUICKLY DOWN TO 80'S, BUT REMAINS IN ATRIAL FIBRILLATION


BP STABLE 'S SYSTOLIC. 


GIVEN LASIX FOR CHF





0600--CARE TURNED OVER TO DR. GALLAGHER, LAB PENDING. 


 (GABRIELE FELIPE DO)


Progress Note :  


   Time:  07:37


Progress Note


Care assumed from Dr. FELIPE at shift change.  Labs, x-ray, and EKGs reviewed.  

Patient is feeling better and heart rate is stable on Cardizem drip at 5 mg/h 

with heart rate running at around 110.  Case was discussed with Dr. Yoo who 

would like patient continue on Cardizem drip and admitted.  He requests we give 

her morning dose of metoprolol  mg and Eliquis 5 mg which she has not yet 

taken this morning.  I discussed CODE STATUS with the patient and she request 

full code, but she does not want to be kept alive long-term on life support.


 (TAMMY GALLAGHER MD)


Initial ECG Impression Date:  Dusty 15, 2022


Initial ECG Impression Time:  05:41


Initial ECG Rate:  132


Initial ECG Impression:  Atrial Fibrillation w/RVR


 (GABRIELE FELIPE DO)


EKG :  


   EKG Time:  06:18


   Rate:  115


   Rhythm:  A Fib/Flutter


Comment


Atrial fibrillation with mild RVR.  No STEMI.  Subtle nondiagnostic ST changes. 

Borderline right axis deviation


 (TAMMY GALLAGHER MD)


Diagnostic Imaging





Comments


CXR--


 (GABRIELE FELIPE DO)





   Diagonstic Imaging:  Xray


   Plain Films/CT/US/NM/MRI:  chest


Comments


Chest x-ray viewed by me and report reviewed.  See report below:





NAME:   ADAMS WATKINS


MED REC#:   [id number]


ACCOUNT#:   [id number]


PT STATUS:   [facility] ER


:   1945


PHYSICIAN:   GABRIELE FELIPE DO


ADMIT DATE:   01/15/22/ER


***Draft***


Date of Exam:01/15/22





CHEST 1 VIEW, AP/PA ONLY





HISTORY: Chest pain





TECHNIQUE: Frontal view of the chest.





COMPARISON: 2021





FINDINGS:





Cardiomegaly appears stable. Sternotomy wires and post-CABG


changes are noted. There is a small right pleural effusion. No


pneumothorax is seen. No focal consolidation is seen.





IMPRESSION:


1. Stable cardiomegaly with minimal right pleural effusion.





  Dictated on workstation # [id number]





Dict:   01/15/22 [id number]


Trans:   01/15/22 0710


CV [phone]





Interpreted by:     CATHIE RITTER MD


 (TAMMY GALLAGHER MD)





Departure


Communication (Admissions)


Time/Spoke to Admitting Phy:  07:30


Dr. Longoria


Time/Spoke to Consulting Phy:  07:25


Dr. Yoo


 (TAMMY GALLAGHER MD)





Impression





   Primary Impression:  


   Atrial fibrillation with RVR


   Additional Impressions:  


   CAD (coronary artery disease)


   Qualified Codes:  I25.10 - Atherosclerotic heart disease of native coronary 

   artery without angina pectoris


   Chest pain


   Qualified Codes:  R07.9 - Chest pain, unspecified


   ASVD (arteriosclerotic vascular disease)


   CHF (congestive heart failure)


   Qualified Codes:  I50.9 - Heart failure, unspecified


Disposition:  09 ADMITTED AS INPATIENT


Condition:  Improved





Admissions


Decision to Admit Reason:  Admit from ER (General)


Decision to Admit/Date:  Dusty 15, 2022


Time/Decision to Admit Time:  05:40


 (TAMMY GALLAGHER MD)





Departure-Patient Inst.


Referrals:  


TISH DE LOS SANTOS DO (PCP/Family)


Primary Care Physician











GABRIELE FELIPE DO                 Dusty 15, 2022 05:47


TAMMY GALLAGHER MD        Dusty 15, 2022 07:36

## 2022-01-16 LAB
ALBUMIN SERPL-MCNC: 3.7 GM/DL (ref 3.2–4.5)
ALP SERPL-CCNC: 80 U/L (ref 40–136)
ALT SERPL-CCNC: 37 U/L (ref 0–55)
BASOPHILS # BLD AUTO: 0 10^3/UL (ref 0–0.1)
BASOPHILS NFR BLD AUTO: 0 % (ref 0–10)
BILIRUB SERPL-MCNC: 1.2 MG/DL (ref 0.1–1)
BUN/CREAT SERPL: 16
CALCIUM SERPL-MCNC: 8.7 MG/DL (ref 8.5–10.1)
CHLORIDE SERPL-SCNC: 102 MMOL/L (ref 98–107)
CO2 SERPL-SCNC: 26 MMOL/L (ref 21–32)
CREAT SERPL-MCNC: 0.87 MG/DL (ref 0.6–1.3)
EOSINOPHIL # BLD AUTO: 0.1 10^3/UL (ref 0–0.3)
EOSINOPHIL NFR BLD AUTO: 1 % (ref 0–10)
GFR SERPLBLD BASED ON 1.73 SQ M-ARVRAT: 68 ML/MIN
GLUCOSE SERPL-MCNC: 133 MG/DL (ref 70–105)
HCT VFR BLD CALC: 41 % (ref 35–52)
HGB BLD-MCNC: 13.2 G/DL (ref 11.5–16)
LYMPHOCYTES # BLD AUTO: 1.1 10^3/UL (ref 1–4)
LYMPHOCYTES NFR BLD AUTO: 11 % (ref 12–44)
MANUAL DIFFERENTIAL PERFORMED BLD QL: NO
MCH RBC QN AUTO: 28 PG (ref 25–34)
MCHC RBC AUTO-ENTMCNC: 32 G/DL (ref 32–36)
MCV RBC AUTO: 87 FL (ref 80–99)
MONOCYTES # BLD AUTO: 0.7 10^3/UL (ref 0–1)
MONOCYTES NFR BLD AUTO: 7 % (ref 0–12)
NEUTROPHILS # BLD AUTO: 7.5 10^3/UL (ref 1.8–7.8)
NEUTROPHILS NFR BLD AUTO: 79 % (ref 42–75)
PLATELET # BLD: 197 10^3/UL (ref 130–400)
PMV BLD AUTO: 11.4 FL (ref 9–12.2)
POTASSIUM SERPL-SCNC: 3.8 MMOL/L (ref 3.6–5)
PROT SERPL-MCNC: 6.2 GM/DL (ref 6.4–8.2)
SODIUM SERPL-SCNC: 141 MMOL/L (ref 135–145)
WBC # BLD AUTO: 9.4 10^3/UL (ref 4.3–11)

## 2022-01-16 RX ADMIN — POTASSIUM CHLORIDE SCH MLS/HR: 200 INJECTION, SOLUTION INTRAVENOUS at 05:19

## 2022-01-16 RX ADMIN — ALBUTEROL SULFATE SCH MG: 2.5 SOLUTION RESPIRATORY (INHALATION) at 22:31

## 2022-01-16 RX ADMIN — APIXABAN SCH MG: 5 TABLET, FILM COATED ORAL at 08:10

## 2022-01-16 RX ADMIN — POTASSIUM CHLORIDE SCH MEQ: 1500 TABLET, EXTENDED RELEASE ORAL at 05:20

## 2022-01-16 RX ADMIN — POTASSIUM CHLORIDE SCH MEQ: 1500 TABLET, EXTENDED RELEASE ORAL at 08:10

## 2022-01-16 RX ADMIN — FUROSEMIDE SCH MG: 10 INJECTION, SOLUTION INTRAVENOUS at 05:41

## 2022-01-16 RX ADMIN — SENNOSIDES SCH MG: 8.6 TABLET, FILM COATED ORAL at 07:25

## 2022-01-16 RX ADMIN — APIXABAN SCH MG: 5 TABLET, FILM COATED ORAL at 20:58

## 2022-01-16 RX ADMIN — ALBUTEROL SULFATE SCH MG: 2.5 SOLUTION RESPIRATORY (INHALATION) at 07:12

## 2022-01-16 RX ADMIN — DOCUSATE SODIUM SCH MG: 100 CAPSULE ORAL at 07:25

## 2022-01-16 RX ADMIN — AMIODARONE HYDROCHLORIDE SCH MG: 200 TABLET ORAL at 20:58

## 2022-01-16 RX ADMIN — DEXTROSE SCH MLS/HR: 5 SOLUTION INTRAVENOUS at 00:40

## 2022-01-16 RX ADMIN — SENNOSIDES SCH MG: 8.6 TABLET, FILM COATED ORAL at 20:59

## 2022-01-16 RX ADMIN — DOCUSATE SODIUM SCH MG: 100 CAPSULE ORAL at 20:59

## 2022-01-16 RX ADMIN — MAGNESIUM SULFATE IN DEXTROSE SCH MLS/HR: 10 INJECTION, SOLUTION INTRAVENOUS at 05:27

## 2022-01-16 RX ADMIN — Medication SCH MLS/HR: at 10:26

## 2022-01-16 NOTE — ANESTHESIA-GENERAL POST-OP
MAC


Patient Condition


Mental Status/LOC:  Same as Preop


Cardiovascular:  Satisfactory


Nausea/Vomiting:  Absent


Respiratory:  Satisfactory


Pain:  Controlled


Complications:  Absent





Post Op Complications


Complications


None





Follow Up Care/Instructions


Patient Instructions


None needed.





Anesthesiology Discharge Order


Discharge Order


Patient is doing well, no complaints, stable vital signs, no apparent adverse 

anesthesia problems.   


No complications reported per nursing.











JEREMI HALL CRNA           Jan 16, 2022 13:27

## 2022-01-16 NOTE — PROGRESS NOTE - CARDIOLOGY
Cardiology SOAP Progress Note


Subjective:


No new symptoms


Shortness of breath with activity


No cp or syncope


Intermittent feeling of palpitations when in A Fib





Objective:


I&O/Vital Signs











 1/16/22 1/16/22 1/16/22 1/16/22





 02:00 03:00 04:00 04:00


 


Temp    36.6


 


Pulse 103 90 105 


 


Resp 14 16 18 


 


B/P (MAP) 144/111 152/114 124/103 


 


Pulse Ox 92 93 99 


 


O2 Delivery Nasal Cannula Nasal Cannula Nasal Cannula Nasal Cannula


 


O2 Flow Rate 3.00 3.00 3.00 3.00


 


    





 1/16/22 1/16/22 1/16/22 1/16/22





 05:00 06:00 07:00 07:00


 


Pulse 122 101 95 103


 


Resp 15 20  14


 


B/P (MAP) 137/108 145/106  144/114


 


Pulse Ox 100 98  96


 


O2 Delivery Nasal Cannula Nasal Cannula  Nasal Cannula


 


O2 Flow Rate 3.00 3.00  3.00





 1/16/22 1/16/22 1/16/22 1/16/22





 07:13 08:00 08:00 08:00


 


Temp    36.2


 


Pulse   124 


 


Resp   28 


 


B/P (MAP)   153/112 


 


Pulse Ox 95 96 95 


 


O2 Delivery Nasal Cannula Nasal Cannula Nasal Cannula 


 


O2 Flow Rate 1.00 3.00 3.00 


 


    





 1/16/22 1/16/22 1/16/22 1/16/22





 09:00 10:00 11:00 12:00


 


Temp    36.7


 


Pulse 124 116 130 


 


Resp 14 12 16 


 


B/P (MAP) 127/100 142/98 148/116 


 


Pulse Ox 97 93 98 


 


O2 Delivery Nasal Cannula Nasal Cannula Nasal Cannula 


 


O2 Flow Rate 3.00 3.00 3.00 


 


    





 1/16/22   





 12:00   


 


Pulse 129   


 


Resp 35   


 


B/P (MAP) 129/100   


 


Pulse Ox 93   


 


O2 Delivery Nasal Cannula   


 


O2 Flow Rate 3.00   














 1/16/22





 00:00


 


Intake Total 1153 ml


 


Output Total 2550 ml


 


Balance -1397 ml








Weight (Pounds):  140


Weight (Ounces):  0.0


Weight (Calculated Kilograms):  63.427629


Constitutional:  AAO x 3, well-developed, other (thin and frail)


Respiratory:  No accessory muscle use; other (fair to good, bilateral air entry,

diminished at the bases)


Cardiovascular:  irregularly irregular, S1 and S2, systolic murmur (soft MARIAN at 

the card base)


Gastrointestional:  No tender; soft; No guarding, No rebound; audible bowel 

sounds


Extremities:  No clubbing, No cyanosis, No significant edema


Skin:  No rash on exposed areas, No ulcerations on exposed areas





Results/Procedures:


Labs


Laboratory Tests


1/16/22 04:44: 


White Blood Count 9.4, Red Blood Count 4.70, Hemoglobin 13.2, Hematocrit 41, 

Mean Corpuscular Volume 87, Mean Corpuscular Hemoglobin 28, Mean Corpuscular 

Hemoglobin Concent 32, Red Cell Distribution Width 14.8H, Platelet Count 197, 

Mean Platelet Volume 11.4, Immature Granulocyte % (Auto) 0, Neutrophils (%) 

(Auto) 79H, Lymphocytes (%) (Auto) 11L, Monocytes (%) (Auto) 7, Eosinophils (%) 

(Auto) 1, Basophils (%) (Auto) 0, Neutrophils # (Auto) 7.5, Lymphocytes # (Auto)

1.1, Monocytes # (Auto) 0.7, Eosinophils # (Auto) 0.1, Basophils # (Auto) 0.0, 

Immature Granulocyte # (Auto) 0.0, Sodium Level 141, Potassium Level 3.8, 

Chloride Level 102, Carbon Dioxide Level 26, Anion Gap 13, Blood Urea Nitrogen 

14, Creatinine 0.87, Estimat Glomerular Filtration Rate 68, BUN/Creatinine Ratio

16, Glucose Level 133H, Calcium Level 8.7, Corrected Calcium 8.9, Total 

Bilirubin 1.2H, Aspartate Amino Transf (AST/SGOT) 17, Alanine Aminotransferase 

(ALT/SGPT) 37, Alkaline Phosphatase 80, Total Protein 6.2L, Albumin 3.7





Laboratory Tests


1/15/22 05:55








1/16/22 04:44











A/P:


Assessment:


Paroxysmal atrial fibrillation


- treated with elec CV on 9/23/21 and on 1/16/22


- first diagnosed March 2021 hospital admission


- TSH WNL on 9-23-21


- OAC with Eliquis





Ac diastolic CHF, likely due to PAF with RVR





Coronary artery disease


- history of CABG done in 2001


- cardiac catheterization in February 2016: LAD occluded at the midportion, the 

distal LAD protected by patent LIMA; high diagonal branch stent stent known to 

be Promus 2.2 x 12 mm placed in 2012; the circumflex had a patent stent in the 

proximal portion; RCA was occluded proximally protected by patent vein graft to 

the distal right coronary artery.


- Patient had an abnormal stress test in April 2018, attempt for cardiac 

catheterization failed due to the extensive disease in her thoracic and 

abdominal aorta. Medical therapy recommended


- Echocardiogram of 3-15-21 by Dr. Cabrera showed LVEF 55-65%. LA mod dilated. RA 

dilated. PASP 30-35 mmHg





History of thoracic and abdominal aneurysm


- status post repair done by Dr. Alcaraz in [place] in 2013.





Peripheral arterial disease


- left lower extremity aneurysm, following [facility] (Dr Alcaraz).





Hypertension


- controlled





Hyperlipidemia


- statin tx - followed by PCP





History of carotid stenosis


- followed by Dr. Alcaraz at [facility]





History of intermittent hypokalemia


- managed by PCP


Plan:





* Change dilt and furosemide to oral


* Amiodarone for rhythm maintenance


* Continue Eliquis for stroke prophylaxis (she is on it chronically and has not 

  missed any doses)


* Replenish lytes


* Monitor labs





Clinical Quality Measures


AMI/AHF:


ASA po Prior to arrival:  DAVID Garcia MD FACP FAC CCDS   Jan 16, 2022 13:08

## 2022-01-16 NOTE — PROGRESS NOTE - HOSPITALIST
Subjective


HPI/CC On Admission


Date Seen by Provider:  Jan 16, 2022


Time Seen by Provider:  10:00


Chief complaint: Atrial fibrillation with rapid ventricular response





History of present illness: This is a 76-year-old white female clinic patient of

Dr. Araiza and Dr. Huff who has a past medical history of chronic atrial 

fibrillation maintained on anticoagulation.  She reports that she began feeling 

short of breath and palpitations so she called 911 and ambulance brought her to 

the ER patient was found to have A. fib with RVR.  Currently she is on a 

Cardizem drip.  Anticoagulation was restarted.  Dr. Huff consulted.  Volume 

overload noted so Lasix will be given.


Subjective/Events-last exam


Patient doing well


Cardioversion planned today


No pain is reported


Elevated blood pressure we will restart home meds


Check meds labs





Review of Systems


General:  Fatigue, Malaise





Objective


Exam


Vital Signs





Vital Signs








  Date Time  Temp Pulse Resp B/P (MAP) Pulse Ox O2 Delivery O2 Flow Rate FiO2


 


1/17/22 05:00  66 15 192/111 98 Nasal Cannula 2.00 


 


1/17/22 04:00 36.6       





Capillary Refill : Less Than 3 Seconds


General Appearance:  No Apparent Distress, WD/WN, Anxious, Chronically ill


Respiratory:  Lungs Clear, Normal Breath Sounds


Cardiovascular:  Irregularly Irregular, Tachycardia


Neurologic/Psychiatric:  Alert, Oriented x3, No Motor/Sensory Deficits, Normal 

Mood/Affect





Results/Procedures


Lab


Laboratory Tests


1/17/22 04:08








Patient resulted labs reviewed.





Assessment/Plan


Assessment and Plan


Assess & Plan/Chief Complaint


Assessment:


A. fib with RVR


Volume overload


Smoker


CAD


History of bypass


Hypertension


Hyperlipidemia





Plan:


Cardizem drip


Anticoagulation


ICU care


Lasix





1/16/2022:


Supportive care


Cardioversion


Appreciate cardiology





Diagnosis/Problems


Diagnosis/Problems





(1) Atrial fibrillation with RVR


Status:  Acute


(2) Chest pain


Status:  Acute


Qualifiers:  


   Chest pain type:  unspecified  Qualified Codes:  R07.9 - Chest pain, 

unspecified


(3) CHF (congestive heart failure)


Status:  Acute


Qualifiers:  


   Heart failure type:  unspecified  Heart failure chronicity:  acute on chronic

 Qualified Codes:  I50.9 - Heart failure, unspecified


(4) ASVD (arteriosclerotic vascular disease)


Status:  Acute





Clinical Quality Measures


AMI/AHF:


ASA po Prior to arrival:  SEAMUS Villalobos DO                Jan 16, 2022 06:22

## 2022-01-17 VITALS — SYSTOLIC BLOOD PRESSURE: 192 MMHG | DIASTOLIC BLOOD PRESSURE: 116 MMHG

## 2022-01-17 LAB
ALBUMIN SERPL-MCNC: 3.6 GM/DL (ref 3.2–4.5)
ALP SERPL-CCNC: 96 U/L (ref 40–136)
ALT SERPL-CCNC: 31 U/L (ref 0–55)
BASOPHILS # BLD AUTO: 0.1 10^3/UL (ref 0–0.1)
BASOPHILS NFR BLD AUTO: 0 % (ref 0–10)
BILIRUB SERPL-MCNC: 1.3 MG/DL (ref 0.1–1)
BUN/CREAT SERPL: 13
CALCIUM SERPL-MCNC: 8.6 MG/DL (ref 8.5–10.1)
CHLORIDE SERPL-SCNC: 101 MMOL/L (ref 98–107)
CO2 SERPL-SCNC: 27 MMOL/L (ref 21–32)
CREAT SERPL-MCNC: 0.86 MG/DL (ref 0.6–1.3)
EOSINOPHIL # BLD AUTO: 0.2 10^3/UL (ref 0–0.3)
EOSINOPHIL NFR BLD AUTO: 2 % (ref 0–10)
GFR SERPLBLD BASED ON 1.73 SQ M-ARVRAT: 69 ML/MIN
GLUCOSE SERPL-MCNC: 120 MG/DL (ref 70–105)
HCT VFR BLD CALC: 41 % (ref 35–52)
HGB BLD-MCNC: 13.7 G/DL (ref 11.5–16)
LYMPHOCYTES # BLD AUTO: 1.1 10^3/UL (ref 1–4)
LYMPHOCYTES NFR BLD AUTO: 10 % (ref 12–44)
MANUAL DIFFERENTIAL PERFORMED BLD QL: NO
MCH RBC QN AUTO: 28 PG (ref 25–34)
MCHC RBC AUTO-ENTMCNC: 33 G/DL (ref 32–36)
MCV RBC AUTO: 85 FL (ref 80–99)
MONOCYTES # BLD AUTO: 1 10^3/UL (ref 0–1)
MONOCYTES NFR BLD AUTO: 9 % (ref 0–12)
NEUTROPHILS # BLD AUTO: 9 10^3/UL (ref 1.8–7.8)
NEUTROPHILS NFR BLD AUTO: 79 % (ref 42–75)
PLATELET # BLD: 181 10^3/UL (ref 130–400)
PMV BLD AUTO: 11.2 FL (ref 9–12.2)
POTASSIUM SERPL-SCNC: 3.5 MMOL/L (ref 3.6–5)
PROT SERPL-MCNC: 5.9 GM/DL (ref 6.4–8.2)
SODIUM SERPL-SCNC: 141 MMOL/L (ref 135–145)
WBC # BLD AUTO: 11.3 10^3/UL (ref 4.3–11)

## 2022-01-17 RX ADMIN — AMIODARONE HYDROCHLORIDE SCH MG: 200 TABLET ORAL at 07:53

## 2022-01-17 RX ADMIN — SENNOSIDES SCH MG: 8.6 TABLET, FILM COATED ORAL at 08:24

## 2022-01-17 RX ADMIN — POTASSIUM CHLORIDE SCH MEQ: 1500 TABLET, EXTENDED RELEASE ORAL at 05:02

## 2022-01-17 RX ADMIN — MAGNESIUM SULFATE IN DEXTROSE SCH MLS/HR: 10 INJECTION, SOLUTION INTRAVENOUS at 05:47

## 2022-01-17 RX ADMIN — POTASSIUM CHLORIDE SCH MLS/HR: 200 INJECTION, SOLUTION INTRAVENOUS at 05:02

## 2022-01-17 RX ADMIN — DOCUSATE SODIUM SCH MG: 100 CAPSULE ORAL at 08:24

## 2022-01-17 RX ADMIN — APIXABAN SCH MG: 5 TABLET, FILM COATED ORAL at 07:53

## 2022-01-17 NOTE — DISCHARGE INST-CARDIOLOGY
Discharge Inst-Cardiac


Discharge Medications


New Medications:  


Amiodarone HCl (Amiodarone HCl) 400 Mg Tablet


400 MG PO BID, #60 TAB 1 Refill





 


Continued Medications:  


Apixaban (Eliquis) 5 Mg Tablet


5 MG PO BID, TAB





Aspirin (Aspirin EC) 81 Mg Tablet.dr


81 MG PO DAILY, TAB





Cholecalciferol (Vitamin D3) (Vitamin D3) 25 Mcg Capsule


25 MCG PO DAILY, CAP





Diltiazem HCl (Diltiazem 24Hr ER) 240 Mg Cap.er.24h


240 MG PO HS, CAP





Lisinopril (Lisinopril) 20 Mg Tablet


20 MG PO 1200, TAB





Metoprolol Succinate (Metoprolol Succinate) 100 Mg Tab.er.24h


100 MG PO DAILY, TAB





Minoxidil (Minoxidil) 10 Mg Tab


10 MG PO BID, TAB








New, Converted or Re-Newed RX:  Transmitted to Pharmacy





Patient Instructions


Patient Instructions:  


Please schedule follow up appointment to see Dr. Huff in 2 weeks











LORENZO OLIVERIA           Jan 17, 2022 08:50

## 2022-01-17 NOTE — PHYSICAL THERAPY EVALUATION
PT Evaluation-General


Medical Diagnosis


Admission Date


Dusty 15, 2022 at 18:08


Medical Diagnosis:  AFib with RVR


Onset Date:  Dusty 15, 2022





Therapy Diagnosis


Therapy Diagnosis:  debility





Height/Weight


Height (Feet):  5


Height (Inches):  0.00


Weight (Pounds):  140


Weight (Ounces):  0.0





Precautions


Precautions/Isolations:  Standard Precautions





Referral


Physician:  David


Reason for Referral:  Evaluation/Treatment





Medical History


Pertinent Medical History:  Atrial Fib, CAD, COPD, Heart Failure, HTN, MI, PVD


Current History


EMS secondary to SOA, sweats, nausea, swelling


Reviewed History:  Yes





Social History


Home:  Single Level


Current Living Status:  Children





Prior


Prior Level of Function


SCALE: Activities may be completed with or without assistive devices.





6-Indepedent-patient completes the activity by him/herself with no assistance 

from a helper.


5-Set-up or Clean-up Assistance-helper sets up or cleans up; patient completes 

activity. [place] assists only prior to or  


    following the activity.


4-Supervision or Touching Assistance-helper provides verbal cues and/or 

touching/steadying and/or contact guard assistance as patient completes 

activity. Assistance may be provided   


    throughout the activity or intermittently.


3-Partial/Moderate Assistance-helper does LESS THAN HALF the effort. [place] lif

ts, holds or supports trunk or limbs, but provides less than half the effort.


2-Substantial/Maximal Assistance-helper does MORE THAN HALF the effort. [place] 

lifts or holds trunk or limbs and provides more than half the effort.


9-Bcqwjflkz-txjojo does ALL the effort. Patient does none of the effort to 

complete the activity. Or, the assistance of 2 or more helpers is required for 

the patient to complete the  


    activity.


If activity was not attempted, code reason:


7-Patient Refused.


9-Not Applicable-not attempted and the patient did not perform the activity 

before the current illness, exacerbation or injury.


10-Not Attempted due to Environmental Limitations-(lack of equipment, weather 

restraints, etc.).


88-Not Attempted due to Medical Conditions or Safety Concerns.


Bed Mobility:  6


Transfers (B,C,W/C):  6


Gait:  6


Indoor Mobility (Ambulation):  Independent


Prior Devices Use:  None





PT Evaluation-Current


Subjective


Patient agrees to PT.





Objective


Patient Orientation:  Normal For Age





ROM/Strength


ROM Lower Extremities


bilateral LE WFL


Strength Lower Extremities


4-/5 grossly bilateral LE





Integumentary/Posture


Bowel Incontinence:  Yes





Neuromuscular


(Tone, Coordination, Reflexes)


grossly intact





Sensory


Vision:  Functional


Hearing:  Functional





Transfers


Lying to Sitting/Side of Bed(Q:  6


Sit to Stand (QC):  4


Chair/Bed-to-Chair Xfer(QC):  4





Gait


Does the Patient Walk?:  Yes


Mode of Locomotion:  Walk


Anticipated Mode of Locomotion:  Walk


Walk 10 feet (QC):  4


Walk 50 ft with 2 Turns(QC):  4


Walk 150 ft (QC):  4


Distance:  250'


Gait Assistive Device:  None


Comments/Gait Description


slightly unsteady with self correct





Balance


Sitting Static:  Good


Sitting Dynamic:  Good


Standing Static:  Fair


 Standing Dynamic:  Fair





Assessment/Needs


76 y.o. female, lives with family who assist with all mobility, ADL's and 

cooking.  Patient is at [facility] with all mobility


Rehab Potential:  Fair





PT Plan


Treatment/Plan


Treatment Plan:  Discontinue PT


Treatment Duration:  Jan 17, 2022


Frequency:  1 time per week


Estimated Hrs Per Day:  .25 hour per day


Patient and/or Family Agrees t:  Yes





Time/GCodes


Time In:  1108


Time Out:  1118


Total Billed Treatment Time:  10


Total Billed Treatment


1 visit


EVLowC 10 min











CRISSY SEN PT              Jan 17, 2022 11:55

## 2022-03-29 ENCOUNTER — HOSPITAL ENCOUNTER (EMERGENCY)
Dept: HOSPITAL 75 - ER | Age: 77
Discharge: HOME | End: 2022-03-29
Payer: MEDICARE

## 2022-03-29 VITALS — DIASTOLIC BLOOD PRESSURE: 85 MMHG | SYSTOLIC BLOOD PRESSURE: 167 MMHG

## 2022-03-29 DIAGNOSIS — Z79.01: ICD-10-CM

## 2022-03-29 DIAGNOSIS — I48.91: ICD-10-CM

## 2022-03-29 DIAGNOSIS — K06.8: Primary | ICD-10-CM

## 2022-03-29 PROCEDURE — 99283 EMERGENCY DEPT VISIT LOW MDM: CPT

## 2022-03-29 NOTE — ED EENT
History of Present Illness


General


Chief Complaint:  Dental Problems/Pain


Stated Complaint:  TOOTH BLEEDING


Source:  patient


Exam Limitations:  no limitations





History of Present Illness


Date Seen by Provider:  Mar 29, 2022


Time Seen by Provider:  22:37


Initial Comments


Right lower dental bleeding that began when she went to bed tonight. No pain, no

injury. On Eliquis for a-fib


Timing/Duration:  abrupt


Severity:  mild


Prearrival Treatment:  no prearrival treatment


Associated Symptoms:  denies symptoms





Allergies and Home Medications


Allergies


Coded Allergies:  


     LUZ MARINAANo Known Allergies (Verified  Allergy, Unknown, 4/24/07)





Patient Home Medication List


Home Medication List Reviewed:  Yes


Amiodarone HCl (Amiodarone HCl) 400 Mg Tablet, 400 MG PO BID


   Prescribed by: LORENZO OLIVEIRA on 1/17/22 0848


Apixaban (Eliquis) 5 Mg Tablet, 5 MG PO BID, (Reported)


   Entered as Reported by: IGNACIO CARPENTER on 9/22/21 1621


Aspirin (Aspirin EC) 81 Mg Tablet.dr, 81 MG PO DAILY, (Reported)


   Entered as Reported by: IGNACIO CARPENTER on 9/22/21 1559


Cholecalciferol (Vitamin D3) (Vitamin D3) 25 Mcg Capsule, 25 MCG PO DAILY, 

(Reported)


   Entered as Reported by: IGNACIO CARPENTER on 3/15/21 1137


Diltiazem HCl (Diltiazem 24Hr ER) 240 Mg Cap.er.24h, 240 MG PO HS, (Reported)


   Entered as Reported by: IGNACIO CARPENTER on 1/17/22 0838


Lisinopril (Lisinopril) 20 Mg Tablet, 20 MG PO 1200, (Reported)


   Entered as Reported by: IRWIN RUTHERFORD on 2/1/16 2216


Metoprolol Succinate (Metoprolol Succinate) 100 Mg Tab.er.24h, 100 MG PO DAILY, 

(Reported)


   Entered as Reported by: IGNACIO CARPENTER on 1/17/22 0838


Minoxidil (Minoxidil) 10 Mg Tab, 10 MG PO BID, (Reported)


   Entered as Reported by: ELIZABETH MCNALLY on 4/17/18 0940





Review of Systems


Review of Systems


Constitutional:  see HPI


Eyes:  No Symptoms Reported


Ears:  No Symptoms Reported


Nose:  no symptoms reported


Mouth:  see HPI


Throat:  no symptoms reported


Respiratory:  no symptoms reported


Cardiovascular:  no symptoms reported


Musculoskeletal:  no symptoms reported





Past Medical-Social-Family Hx


Immunizations Up To Date


Tetanus Booster (TDap):  More than 5yrs


PED Vaccines UTD:  No


First/Initial COVID19 Vaccinat:  UNVACCINATED


Second COVID19 Vaccination Vick:  UNVACCINATED


Third COVID19 Vaccination Date:  UNVACCINATED





Seasonal Allergies


Seasonal Allergies:  No





Past Medical History


Surgery/Hospitalization HX:  


SIGNIFICANT CARDIAC SURGICAL HISTORY


Surgeries:  Yes (AAA REPAIR; LEFT LEG ANURYSM REPAIR; CARDIAC STENTS X 5;CABG)


Abdominal, Cardiac, CABG, Coronary Stent, Tonsillectomy, Vascular Surgery


Respiratory:  Yes


Sleep Apnea, COPD


Currently Using CPAP:  No


Currently Using BIPAP:  No


Cardiac:  Yes (CHF; CABG; STENTS X 5; LEFT LEG ANEURYSM REPAIR;THORACIC &AAA 

REPAIR)


Aneurysm, Atrial Fibrillation, Chronic Edema/Swelling, Coronary Artery Disease, 

Heart Attack, High Cholesterol, Hypertension, Peripheral Vascular


Neurological:  No


Reproductive Disorders:  No


GYN History:  Menopausal


Gastrointestinal:  No


Musculoskeletal:  No


Endocrine:  No


Cancer:  No


Psychosocial:  Yes


Anxiety


Integumentary:  No


Blood Disorders:  No


Adverse Reaction/Blood Tranf:  No





Family Medical History





FHx: stroke


  19 FATHER


  19 MOTHER


No Pertinent Family Hx





- history of CABG done in 2001


- cardiac catheterization in February 2016: LAD occluded at the


midportion, the distal LAD protected by patent LIMA; high diagonal branch


stent stent known to be Promus 2.2 x 12 mm placed in 2012; the circumflex


had a patent stent in the proximal portion; RCA was occluded proximally


protected by patent vein graft to the distal right coronary artery.


- Patient had an abnormal stress test in April 2018, attempt for cardiac


catheterization failed due to the extensive disease in her thoracic and


abdominal aorta. Medical therapy recommended


- Echocardiogram of 3-15-21 by Dr. Cabrera showed LVEF 55-65%.  LA mod


dilated.  RA dilated.  PASP 30-35 mmHg





History of thoracic and abdominal aneurysm


- status post repair done by Dr. Alcaraz in [place] in 2013.





Peripheral arterial disease 


- left lower extremity aneurysm, following [facility]


(Dr Alcaraz).





History of carotid stenosis


-  followed by Dr. Alcaraz at [facility]





Physical Exam


Height, Weight, BMI


Height: 5'0.00"


Weight: 140lbs. 0.0oz. [id number]; 26.58 BMI


Method:Stated


General Appearance:  WD/WN, no apparent distress


Eyes:  bilateral eye normal inspection, bilateral eye PERRL, bilateral eye EOMI


Ears:  bilateral ear auricle normal, bilateral ear canal normal, bilateral ear 

TM normal


Mouth/Throat:  pharynx normal


Neck:  non-tender, full range of motion


Gastrointestinal:  normal bowel sounds, non tender


Neurologic/Psychiatric:  alert, normal mood/affect, oriented x 3


Skin:  normal color, warm/dry





Progress/Results/Core Measures


Results/Orders


My Orders





Orders - СВЕТЛАНА OCONNELL


Clonidine  Tablet (Catapres  Tablet) (3/29/22 22:45)








Departure


Communication (Admissions)


2241-posterior aspect at the base of tooth #28 is exposed mandible with some 

blood oozing out between the exposed mandible and the tooth itself. Suction 

used, "Hemcon" dental hemostatic pad was applied and then a cotton pledget was 

applied over that for direct pressure with resultant hemostasis.





Impression





   Primary Impression:  


   Gingival hemorrhage


Disposition:  01 HOME, SELF-CARE


Condition:  Stable





Departure-Patient Inst.


Decision time for Depature:  22:41


Referrals:  


TISH DE LOS SANTOS DO (PCP/Family)


Primary Care Physician


Patient Instructions:  Bleeding Gums (DC)





Add. Discharge Instructions:  


1. Call your dentist tomorrow for follow up. Return to ER for any concerns or 

recurrent bleeding. 








All discharge instructions reviewed with patient and/or family. Voiced 

understanding.





Images


Mouth/Nose











1 - 














СВЕТЛАНА OCONNELL             Mar 29, 2022 22:38

## 2022-07-21 ENCOUNTER — HOSPITAL ENCOUNTER (OUTPATIENT)
Dept: HOSPITAL 75 - INFUSION | Age: 77
Discharge: HOME | End: 2022-07-21
Attending: INTERNAL MEDICINE
Payer: MEDICARE

## 2022-07-21 VITALS — WEIGHT: 128.09 LBS | HEIGHT: 60.98 IN | BODY MASS INDEX: 24.18 KG/M2

## 2022-07-21 VITALS — DIASTOLIC BLOOD PRESSURE: 68 MMHG | SYSTOLIC BLOOD PRESSURE: 158 MMHG

## 2022-07-21 VITALS — DIASTOLIC BLOOD PRESSURE: 77 MMHG | SYSTOLIC BLOOD PRESSURE: 165 MMHG

## 2022-07-21 DIAGNOSIS — U07.1: Primary | ICD-10-CM

## 2023-05-26 ENCOUNTER — HOSPITAL ENCOUNTER (OUTPATIENT)
Dept: HOSPITAL 75 - CARD | Age: 78
End: 2023-05-26
Attending: INTERNAL MEDICINE
Payer: MEDICARE

## 2023-05-26 DIAGNOSIS — I08.0: Primary | ICD-10-CM

## 2023-05-26 PROCEDURE — 93306 TTE W/DOPPLER COMPLETE: CPT

## 2023-07-11 ENCOUNTER — HOSPITAL ENCOUNTER (OUTPATIENT)
Dept: HOSPITAL 75 - CATH | Age: 78
Discharge: HOME | End: 2023-07-11
Attending: INTERNAL MEDICINE
Payer: MEDICARE

## 2023-07-11 VITALS — SYSTOLIC BLOOD PRESSURE: 121 MMHG | DIASTOLIC BLOOD PRESSURE: 70 MMHG

## 2023-07-11 VITALS — SYSTOLIC BLOOD PRESSURE: 137 MMHG | DIASTOLIC BLOOD PRESSURE: 71 MMHG

## 2023-07-11 VITALS — DIASTOLIC BLOOD PRESSURE: 70 MMHG | SYSTOLIC BLOOD PRESSURE: 121 MMHG

## 2023-07-11 VITALS — DIASTOLIC BLOOD PRESSURE: 62 MMHG | SYSTOLIC BLOOD PRESSURE: 121 MMHG

## 2023-07-11 VITALS — BODY MASS INDEX: 29.55 KG/M2 | HEIGHT: 58.66 IN | WEIGHT: 144.62 LBS

## 2023-07-11 VITALS — SYSTOLIC BLOOD PRESSURE: 116 MMHG | DIASTOLIC BLOOD PRESSURE: 67 MMHG

## 2023-07-11 VITALS — SYSTOLIC BLOOD PRESSURE: 117 MMHG | DIASTOLIC BLOOD PRESSURE: 88 MMHG

## 2023-07-11 VITALS — DIASTOLIC BLOOD PRESSURE: 61 MMHG | SYSTOLIC BLOOD PRESSURE: 115 MMHG

## 2023-07-11 VITALS — SYSTOLIC BLOOD PRESSURE: 119 MMHG | DIASTOLIC BLOOD PRESSURE: 66 MMHG

## 2023-07-11 VITALS — SYSTOLIC BLOOD PRESSURE: 163 MMHG | DIASTOLIC BLOOD PRESSURE: 77 MMHG

## 2023-07-11 DIAGNOSIS — Z98.890: ICD-10-CM

## 2023-07-11 DIAGNOSIS — I48.0: ICD-10-CM

## 2023-07-11 DIAGNOSIS — R29.898: ICD-10-CM

## 2023-07-11 DIAGNOSIS — I65.23: ICD-10-CM

## 2023-07-11 DIAGNOSIS — E78.2: ICD-10-CM

## 2023-07-11 DIAGNOSIS — Z86.79: ICD-10-CM

## 2023-07-11 DIAGNOSIS — Z98.62: ICD-10-CM

## 2023-07-11 DIAGNOSIS — Z79.01: ICD-10-CM

## 2023-07-11 DIAGNOSIS — I25.10: Primary | ICD-10-CM

## 2023-07-11 DIAGNOSIS — Z28.310: ICD-10-CM

## 2023-07-11 DIAGNOSIS — I10: ICD-10-CM

## 2023-07-11 DIAGNOSIS — Z95.1: ICD-10-CM

## 2023-07-11 DIAGNOSIS — E87.6: ICD-10-CM

## 2023-07-11 DIAGNOSIS — I73.9: ICD-10-CM

## 2023-07-11 DIAGNOSIS — I87.2: ICD-10-CM

## 2023-07-11 DIAGNOSIS — Z87.891: ICD-10-CM

## 2023-07-11 DIAGNOSIS — R22.43: ICD-10-CM

## 2023-07-11 DIAGNOSIS — Z79.899: ICD-10-CM

## 2023-07-11 DIAGNOSIS — D15.1: ICD-10-CM

## 2023-07-11 LAB
ALBUMIN SERPL-MCNC: 4.1 GM/DL (ref 3.2–4.5)
ALP SERPL-CCNC: 93 U/L (ref 40–136)
ALT SERPL-CCNC: 14 U/L (ref 0–55)
APTT BLD: 33 SEC (ref 24–35)
BILIRUB SERPL-MCNC: 0.9 MG/DL (ref 0.1–1)
BUN/CREAT SERPL: 13
CALCIUM SERPL-MCNC: 9.4 MG/DL (ref 8.5–10.1)
CHLORIDE SERPL-SCNC: 103 MMOL/L (ref 98–107)
CHOLEST SERPL-MCNC: 149 MG/DL (ref ?–200)
CO2 SERPL-SCNC: 25 MMOL/L (ref 21–32)
CREAT SERPL-MCNC: 0.84 MG/DL (ref 0.6–1.3)
GFR SERPLBLD BASED ON 1.73 SQ M-ARVRAT: 70 ML/MIN
GLUCOSE SERPL-MCNC: 116 MG/DL (ref 70–105)
HCT VFR BLD CALC: 42 % (ref 35–52)
HDLC SERPL-MCNC: 57 MG/DL (ref 40–60)
HGB BLD-MCNC: 13.6 G/DL (ref 11.5–16)
INR PPP: 1 (ref 0.8–1.4)
MCH RBC QN AUTO: 28 PG (ref 25–34)
MCHC RBC AUTO-ENTMCNC: 33 G/DL (ref 32–36)
MCV RBC AUTO: 85 FL (ref 80–99)
PLATELET # BLD: 213 10^3/UL (ref 130–400)
PMV BLD AUTO: 10.7 FL (ref 9–12.2)
POTASSIUM SERPL-SCNC: 3.8 MMOL/L (ref 3.6–5)
PROT SERPL-MCNC: 7.8 GM/DL (ref 6.4–8.2)
PROTHROMBIN TIME: 13.5 SEC (ref 12.2–14.7)
SODIUM SERPL-SCNC: 137 MMOL/L (ref 135–145)
TRIGL SERPL-MCNC: 144 MG/DL (ref ?–150)
VLDLC SERPL CALC-MCNC: 29 MG/DL (ref 5–40)
WBC # BLD AUTO: 11.5 10^3/UL (ref 4.3–11)

## 2023-07-11 PROCEDURE — 93459 L HRT ART/GRFT ANGIO: CPT

## 2023-07-11 PROCEDURE — 85730 THROMBOPLASTIN TIME PARTIAL: CPT

## 2023-07-11 PROCEDURE — 93567 NJX CAR CTH SPRVLV AORTGRPHY: CPT

## 2023-07-11 PROCEDURE — 36415 COLL VENOUS BLD VENIPUNCTURE: CPT

## 2023-07-11 PROCEDURE — 85027 COMPLETE CBC AUTOMATED: CPT

## 2023-07-11 PROCEDURE — 80053 COMPREHEN METABOLIC PANEL: CPT

## 2023-07-11 PROCEDURE — 80061 LIPID PANEL: CPT

## 2023-07-11 PROCEDURE — 85610 PROTHROMBIN TIME: CPT

## 2023-07-11 PROCEDURE — 93005 ELECTROCARDIOGRAM TRACING: CPT

## 2023-07-11 PROCEDURE — 87081 CULTURE SCREEN ONLY: CPT

## 2023-07-11 NOTE — CARDIAC PROCEDURE NOTE-CS/ASA
Pre-Procedure Note


Pre-Op Procedure Note


Date of Available H&P:  Jul 10, 2023


Date H&P Reviewed:  Jul 11, 2023


Time H&P Reviewed:  14:15


History & Physical:  H&P Reviewed, No changes noted





Moderate Sedation PreProcedure


ASA Score


3














Airway 


 


Lungs 


 


Heart 


 


 ASA score


 


 ASA 1: a normal healthy patient


 


 ASA 2:  a patient with a mild systemic disease (mid diabetes, controlled 

hypertension, obesity 


 


 ASA 3:  a patient with a severe systemic disease that limits activity  (angina,

COPD, prior Myocardial infarction)


 


 ASA 4:  a patient with an incapacitating disease that is a constant threat to 

life (CHF, renal failure)


 


 ASA 5:  a moribund patient not expected to survive 24 hrs.  (ruptured aneurysm)


 


 ASA 6:  a declared brain-dead patient whose organs are being harvested.


 


 For emergent operations, add the letter E after the classification











Mallampati Classification


Grade 2





Sedation Plan


Analgesia, Amnesia, Plan communicated to team members


The patient is an appropriate candidate to undergo the planned procedure, 

sedation, and anesthesia.





The patient immediately re-assessed prior to indication.











DAVID YOO MD FACP FAC CCDS   Jul 11, 2023 15:43

## 2023-07-11 NOTE — DISCHARGE INST-CARDIOLOGY
Discharge Inst-Cardiac


Discharge Medications


Continued Medications:  


Amlodipine Besylate (Amlodipine Besylate) 5 Mg Tablet


5 MG PO DAILY, TAB





Apixaban (Eliquis) 5 Mg Tablet


5 MG PO BID, TAB





Aspirin (Aspirin) 325 Mg Tablet


325 MG PO DAILY, TAB





Atorvastatin Calcium (Atorvastatin Calcium) 20 Mg Tablet


20 MG PO DAILY, TAB





Cholecalciferol (Vitamin D3) (Vitamin D3) 25 Mcg Capsule


25 MCG PO DAILY, CAP





Diltiazem HCl (Diltiazem 24Hr ER) 240 Mg Cap.er.24h


240 MG PO HS, CAP





Lisinopril (Lisinopril) 40 Mg Tablet


40 MG PO DAILY, TAB





Losartan Potassium (Losartan Potassium) 100 Mg Tablet


100 MG PO DAILY, TAB





Metoprolol Succinate (Metoprolol Succinate) 100 Mg Tab.er.24h


100 MG PO DAILY, TAB





Minoxidil (Minoxidil) 10 Mg Tab


10 MG PO BID, TAB

















DAVID YOO MD FACP [facility] CCDS   Jul 11, 2023 16:48

## 2023-07-11 NOTE — CARDIAC CATH REPORT
CARDIAC CATHETERIZATION


DATE OF PROCEDURE: 7-11-23





INDICATION: CAD





HISTORY: The patient is a 78 year old female who has been diagnosed with a L 

atrial mass and her CV surgeon Dr Berry has recommend card cath and coronary 

angiography





PROCEDURES PERFORMED: 


1. Cor angio


2. LHC and LV angio


3. Aortic root angio





PROCEDURE DESCRIPTION: After informed consent and in the fasting state, left 

heart catheterization was performed through the L radial artery utilizing a a 6F

sheath and 5F catheters by percutaneous approach.  JR4 for aortocoronary graft 

angio. Right cor artery is known to be chronically occluded in its proximal 

portion and was not reengaged. JL3.5 for cor artery. Pigtail for left heart 

cath, LV angio, and aortic root angio. JR4 for LIMA to LAD.  All catheters were 

exchanged over a guidewire.





HEMODYNAMICS: LVEDP 7 mmHg. No significant pressure gradient on pull back across

the aortic valve.





CORONARY ANGIOGRAPHY:


Coronary calcium present


Left main coronary artery: Ok


Left anterior descending coronary artery: 5060% prox and mid


High-diagonal/Ramus: patent proximal stent, mild diffuse disease


Left circumflex coronary artery: patent prox stent, approx 50% mid vessel


Right coronary artery: Chronically proximally occluded





AORTO-CORONARY GRAFTS:


Cephalic graft known to be to high-diagonal/ramus: chronically occluded


Caudal graft known to be to RCA: occluded in its proximal portion





LEFT INTERNAL MAMMARY ARTERY GRAFT:


Patent, tortuous, grafted to distal LAD with good runoff





LV ANGIO:


Postero-basal akinesis. LVEF approx 60%





AORTIC ROOT ANGIO:


No significant aneurysm or dissection of the aortic root or ascending aorta. 

Good excursion of the aortic leaflets. No patent aorto-coronary grafts. No 

additional vessels unengaged vessels seen








IMPRESSION:


1.  Left main coronary artery: Ok. Left anterior descending coronary artery: 

5060% prox and mid. High-diagonal/Ramus: patent proximal stent, mild diffuse 

disease. Left circumflex coronary artery: patent prox stent, approx 50% mid 

vessel. Right coronary artery: chronically proximally occluded


2. LVEDP 7 mmHg


3. LVEF 60%


4. Posterobasal akinesis











DAVID YOO MD [facility]   Jul 11, 2023 16:06

## 2023-07-11 NOTE — DISCHARGE INST-POST CATH
Discharge Inst-CATH/EP


Post Cardiac Cath/EP D/C Inst


Follow Up/Plan


F/u with Dr Huff in 3-4 weeks








ACTIVITY





* Go Home directly and rest.


* Limit activity of the leg (or wrist if it was used) for 7 days including 

aerobics, swimming,


   jogging, bicycling, etc.


* Restrict stair-climbing for 7 days if possible, if not, climb up with your 

non-cath leg, then


   bring together on the same step.


* Avoid lifting, pushing, pulling or excessive movement of the affected 

extremity for 7 days.


* Customary sexual activity may be resumed after 2 days-use caution not to use a

position  


   that strains or causes pain to the affected extremity.


* No driving for 24 hours.


* NO SMOKING. 


* Avoid straining for bowel movements for 7 days.


* Gentle walking on level ground is allowed.


* Returning to work will depend on the type of procedure and the results. Your 

doctor will discuss


   this with you.





CALL YOUR DOCTOR FOR ANY OF THE FOLLOWING:





*If bleeding from the puncture site occurs- Apply gentle pressure to site with 

clean cloth and call


   your doctor or EMS.


* If a knot or lump forms under the skin, increases in size, or causes pain.


* If bruising appears to be worsening or moving further down your leg instead of

disappearing.


* Temperature above 101 F.





CARE OF YOUR GROIN INCISION;





* Bruising or purple discoloration of the skin near the puncture site is common.


* You may shower only, no bathtub bathing for 5 days.  Be careful to avoid 

slipping as your


   leg may feel stiff.


* If a closure device was used on your femoral artery, please see the attached 

guide regarding


   care of the device and your leg.


* Leave dressing on FOR 24 hours.





CARE OF YOUR WRIST INCISION;





* Bruising or purple discoloration of the skin near the puncture site is common.


* You may shower.


* DO NOT submerge wrist.


* Leave dressing on FOR 24 hours.











DAVID HUFF MD [facility]S   Jul 11, 2023 16:48

## 2023-07-14 ENCOUNTER — HOSPITAL ENCOUNTER (OUTPATIENT)
Dept: HOSPITAL 75 - RAD | Age: 78
End: 2023-07-14
Attending: INTERNAL MEDICINE
Payer: MEDICARE

## 2023-07-14 DIAGNOSIS — M79.89: ICD-10-CM

## 2023-07-14 DIAGNOSIS — R22.42: Primary | ICD-10-CM

## 2023-07-14 NOTE — DIAGNOSTIC IMAGING REPORT
PROCEDURE: 

US left lower extremity venous.



TECHNIQUE: 

Multiple Real-time grayscale images were obtained over the left

lower extremity in various projections. Additional duplex Doppler

and color Doppler images were also obtained.



INDICATION: 

Left leg swelling.



FINDINGS:

There is no evidence of left lower extremity DVT. Left lower

extremity deep venous system shows normal compressibility with

normal response to augmentation and Valsalva. No fluid collection

is seen. There is a mass in the popliteal fossa measuring 6.9 x

5.6 x 5.3 cm. No internal vascularity is seen but this does show

significant internal complexity. It is uncertain if this

represents a solid mass versus a complex cystic mass. MRI with

and without IV contrast would be recommended for further

evaluation.



IMPRESSION:

1. No evidence of left lower extremity DVT.



2. Complex cystic versus solid mass in the popliteal fossa. MRI

with and without IV contrast could be performed for better

characterization.



Dictated by: 



  Dictated on workstation # [id number]

## 2023-07-26 ENCOUNTER — HOSPITAL ENCOUNTER (OUTPATIENT)
Dept: HOSPITAL 75 - RAD | Age: 78
End: 2023-07-26
Payer: MEDICARE

## 2023-07-26 DIAGNOSIS — K44.9: ICD-10-CM

## 2023-07-26 DIAGNOSIS — D21.9: ICD-10-CM

## 2023-07-26 DIAGNOSIS — I51.7: ICD-10-CM

## 2023-07-26 DIAGNOSIS — I25.41: Primary | ICD-10-CM

## 2023-07-26 DIAGNOSIS — I71.23: ICD-10-CM

## 2023-07-26 PROCEDURE — 71260 CT THORAX DX C+: CPT

## 2023-07-26 NOTE — DIAGNOSTIC IMAGING REPORT
PROCEDURE: 

CT chest with contrast only.



TECHNIQUE: 

Multiple contiguous axial images were obtained through the chest

after administration of intravenous contrast. Auto Exposure

Controls were utilized during the CT exam to meet ALARA standards

for radiation dose reduction. 



INDICATION:  

Myxoma, shortness of air.



COMPARISON: 

Radiograph dated 01/15/2022.



FINDINGS: 

Post surgical changes of CABG. Patent aneurysmal dilatation of

one of the bypass grafts involving the right coronary artery is

noted measuring up to 2.6 cm. Tortuosity of the descending

thoracic aorta which is associated with aneurysmal dilatation of

the inferior aspect of the descending thoracic aorta measuring up

to 5.1 cm. The heart is enlarged. No significant pericardial

effusion. Small hiatal hernia. No significant adenopathy within

the chest. No pleural effusion. The trachea is patent. No

pneumothorax. Mild right basilar scarring and/atelectasis. The

visualized upper abdomen is unremarkable. Scattered osseous

degenerative changes. Disc/osteophyte complex is noted at T9/T10

with resulting moderate to severe central canal stenosis.



IMPRESSION: 

Aneurysmal dilatation of a bypass right coronary artery measuring

up to 2.6 cm appearing patent.



Aneurysmal dilatation of the inferior aspect of the descending

thoracic aorta measuring up to 5.1 cm.



Disc/osteophyte complex at T9/T10 with resulting at least

moderate to severe central canal stenosis. This could be further

evaluated with an MRI of the thoracic spine.



Small hiatal hernia.



Cardiomegaly.



Dictated by: 



  Dictated on workstation # [id number]

## 2023-08-11 ENCOUNTER — HOSPITAL ENCOUNTER (OUTPATIENT)
Dept: HOSPITAL 75 - RAD | Age: 78
End: 2023-08-11
Attending: INTERNAL MEDICINE
Payer: MEDICARE

## 2023-08-11 DIAGNOSIS — M17.12: ICD-10-CM

## 2023-08-11 DIAGNOSIS — M25.462: ICD-10-CM

## 2023-08-11 DIAGNOSIS — I70.8: ICD-10-CM

## 2023-08-11 DIAGNOSIS — I72.4: Primary | ICD-10-CM

## 2023-08-11 DIAGNOSIS — M71.22: ICD-10-CM

## 2023-08-11 PROCEDURE — 73706 CT ANGIO LWR EXTR W/O&W/DYE: CPT

## 2023-08-11 NOTE — DIAGNOSTIC IMAGING REPORT
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



COMPARISON: None.



TECHNIQUE: Axial CT angiogram of the left lower extremity,

centered about the knee was performed following intravenous

administration of contrast with sagittal and coronal MIP

reformats.



FINDINGS:



No acute fracture is seen in the left knee. There is severe

degenerative change in the lateral compartment and

moderate-to-severe degenerative change in the medial compartment.

There are moderate degenerative changes in the patellofemoral

compartment. There is a moderate left knee joint effusion. There

is marked chondrocalcinosis.



There is atherosclerosis of the popliteal artery which does not

cause high-grade stenosis. There is extrinsic compression on the

popliteal artery causing about 50% narrowing. There is mild

tortuosity of the distal popliteal artery, with a tiny 5 mm

eccentric aneurysm (Image 48, series 701). There is occlusion of

the left anterior tibial artery just beyond the trifurcation.



There is mild generalized muscular atrophy. There is a large

mildly heterogeneous mass posterior to the knee and distal femur,

which measures 6.3 x 5.8 cm on axial imaging and up to 9.2 cm

craniocaudal. This does appear to be confluent with the Baker's

cyst. There is moderate extrinsic compression of the popliteal

artery, and there does appear to be significant compression on

the popliteal veins.



IMPRESSION:

1. Large mildly heterogeneous mass at the posterior distal left

thigh, causing mass effect on the popliteal artery and veins.

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

given the heterogeneity and potential enhancement, consider MRI

with and without contrast to further evaluate.

2. Severe degenerative changes in the left knee with marked

chondrocalcinosis.

3. Occlusion of the proximal left anterior tibial artery.

4. 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

5. Moderate left knee joint effusion.



Dictated by: 



  Dictated on workstation # [id number]